# Patient Record
Sex: FEMALE | Race: WHITE | NOT HISPANIC OR LATINO | Employment: FULL TIME | URBAN - NONMETROPOLITAN AREA
[De-identification: names, ages, dates, MRNs, and addresses within clinical notes are randomized per-mention and may not be internally consistent; named-entity substitution may affect disease eponyms.]

---

## 2017-06-14 RX ORDER — NORETHINDRONE ACETATE AND ETHINYL ESTRADIOL AND FERROUS FUMARATE 1MG-20(21)
KIT ORAL
Qty: 28 TABLET | Refills: 0 | Status: SHIPPED | OUTPATIENT
Start: 2017-06-14

## 2017-12-26 LAB
EXTERNAL ANTIBODY SCREEN: NEGATIVE
EXTERNAL HEPATITIS B SURFACE ANTIGEN: NEGATIVE
EXTERNAL HERPES PCR: NEGATIVE
EXTERNAL RUBELLA QUALITATIVE: NORMAL
EXTERNAL SYPHILIS RPR SCREEN: NORMAL
HIV1 P24 AG SERPL QL IA: NEGATIVE

## 2017-12-28 ENCOUNTER — LAB (OUTPATIENT)
Dept: LAB | Facility: HOSPITAL | Age: 22
End: 2017-12-28
Attending: OBSTETRICS & GYNECOLOGY

## 2017-12-28 ENCOUNTER — TRANSCRIBE ORDERS (OUTPATIENT)
Dept: ADMINISTRATIVE | Facility: HOSPITAL | Age: 22
End: 2017-12-28

## 2017-12-28 DIAGNOSIS — J02.9 SORE THROAT: ICD-10-CM

## 2017-12-28 DIAGNOSIS — R50.9 FEVER, UNSPECIFIED FEVER CAUSE: ICD-10-CM

## 2017-12-28 DIAGNOSIS — R50.9 FEVER, UNSPECIFIED FEVER CAUSE: Primary | ICD-10-CM

## 2017-12-28 LAB
FLUAV AG NPH QL: NEGATIVE
FLUBV AG NPH QL IA: NEGATIVE
S PYO AG THROAT QL: NEGATIVE

## 2017-12-28 PROCEDURE — 87081 CULTURE SCREEN ONLY: CPT | Performed by: OBSTETRICS & GYNECOLOGY

## 2017-12-28 PROCEDURE — 87880 STREP A ASSAY W/OPTIC: CPT | Performed by: OBSTETRICS & GYNECOLOGY

## 2017-12-28 PROCEDURE — 87804 INFLUENZA ASSAY W/OPTIC: CPT | Performed by: OBSTETRICS & GYNECOLOGY

## 2017-12-30 LAB — BACTERIA SPEC AEROBE CULT: NORMAL

## 2018-05-01 LAB
EXTERNAL CHLAMYDIA SCREEN: NEGATIVE
EXTERNAL GONORRHEA SCREEN: NEGATIVE

## 2018-07-10 LAB — EXTERNAL GROUP B STREP ANTIGEN: NEGATIVE

## 2018-07-22 ENCOUNTER — HOSPITAL ENCOUNTER (OUTPATIENT)
Facility: HOSPITAL | Age: 23
Setting detail: OBSERVATION
Discharge: HOME | End: 2018-07-22
Attending: OBSTETRICS & GYNECOLOGY | Admitting: OBSTETRICS & GYNECOLOGY

## 2018-07-22 LAB — A1 MICROGLOB PLACENTAL VAG QL: NEGATIVE

## 2018-07-22 PROCEDURE — G0378 HOSPITAL OBSERVATION PER HR: HCPCS

## 2018-07-22 PROCEDURE — 84112 EVAL AMNIOTIC FLUID PROTEIN: CPT | Performed by: OBSTETRICS & GYNECOLOGY

## 2018-07-22 NOTE — NURSING NOTE
Patient presents to nursing unit with c/o back pain, abd pain and contractions.       See medical records for charting of assessment and fetal monitoring. Unexpected downtime during this shift.      Aisha Suarez RN  3:22 PM

## 2018-07-23 ENCOUNTER — APPOINTMENT (OUTPATIENT)
Dept: ULTRASOUND IMAGING | Facility: HOSPITAL | Age: 23
End: 2018-07-23

## 2018-07-23 ENCOUNTER — ANESTHESIA (OUTPATIENT)
Dept: LABOR AND DELIVERY | Facility: HOSPITAL | Age: 23
End: 2018-07-23

## 2018-07-23 ENCOUNTER — ANESTHESIA EVENT (OUTPATIENT)
Dept: LABOR AND DELIVERY | Facility: HOSPITAL | Age: 23
End: 2018-07-23

## 2018-07-23 ENCOUNTER — HOSPITAL ENCOUNTER (INPATIENT)
Facility: HOSPITAL | Age: 23
LOS: 2 days | Discharge: HOME | End: 2018-07-25
Attending: OBSTETRICS & GYNECOLOGY | Admitting: OBSTETRICS & GYNECOLOGY

## 2018-07-23 PROBLEM — Z34.93 THIRD TRIMESTER PREGNANCY: Status: ACTIVE | Noted: 2018-07-23

## 2018-07-23 PROBLEM — Z34.93 THIRD TRIMESTER PREGNANCY: Status: RESOLVED | Noted: 2018-07-23 | Resolved: 2018-07-23

## 2018-07-23 PROBLEM — O47.9 THREATENED LABOR AT TERM: Status: ACTIVE | Noted: 2018-07-23

## 2018-07-23 PROBLEM — Z34.90 PREGNANCY: Status: RESOLVED | Noted: 2018-07-23 | Resolved: 2018-07-23

## 2018-07-23 PROBLEM — Z34.90 PREGNANCY: Status: ACTIVE | Noted: 2018-07-23

## 2018-07-23 PROBLEM — O47.9 THREATENED LABOR AT TERM: Status: RESOLVED | Noted: 2018-07-23 | Resolved: 2018-07-23

## 2018-07-23 LAB
A1 MICROGLOB PLACENTAL VAG QL: NEGATIVE
ABO GROUP BLD: NORMAL
BLD GP AB SCN SERPL QL: NEGATIVE
DEPRECATED RDW RBC AUTO: 40.8 FL (ref 40–54)
ERYTHROCYTE [DISTWIDTH] IN BLOOD BY AUTOMATED COUNT: 13 % (ref 12–15)
HCT VFR BLD AUTO: 35.4 % (ref 37–47)
HGB BLD-MCNC: 12.2 G/DL (ref 12–16)
LYMPHOCYTES # BLD MANUAL: 1.45 10*3/MM3 (ref 0.72–4.86)
LYMPHOCYTES NFR BLD MANUAL: 5 % (ref 4–12)
LYMPHOCYTES NFR BLD MANUAL: 9 % (ref 15–45)
MCH RBC QN AUTO: 30 PG (ref 28–32)
MCHC RBC AUTO-ENTMCNC: 34.5 G/DL (ref 33–36)
MCV RBC AUTO: 87.2 FL (ref 82–98)
MONOCYTES # BLD AUTO: 0.81 10*3/MM3 (ref 0.19–1.3)
NEUTROPHILS # BLD AUTO: 13.53 10*3/MM3 (ref 1.87–8.4)
NEUTROPHILS NFR BLD MANUAL: 83 % (ref 39–78)
NEUTS BAND NFR BLD MANUAL: 1 % (ref 0–10)
PLAT MORPH BLD: NORMAL
PLATELET # BLD AUTO: 345 10*3/MM3 (ref 130–400)
PMV BLD AUTO: 9.6 FL (ref 6–12)
POLYCHROMASIA BLD QL SMEAR: ABNORMAL
RBC # BLD AUTO: 4.06 10*6/MM3 (ref 4.2–5.4)
RH BLD: POSITIVE
SPHEROCYTES BLD QL SMEAR: ABNORMAL
T&S EXPIRATION DATE: NORMAL
VARIANT LYMPHS NFR BLD MANUAL: 2 % (ref 0–5)
WBC MORPH BLD: NORMAL
WBC NRBC COR # BLD: 16.11 10*3/MM3 (ref 4.8–10.8)

## 2018-07-23 PROCEDURE — 25010000002 GENTAMICIN PER 80 MG: Performed by: OBSTETRICS & GYNECOLOGY

## 2018-07-23 PROCEDURE — 88307 TISSUE EXAM BY PATHOLOGIST: CPT | Performed by: OBSTETRICS & GYNECOLOGY

## 2018-07-23 PROCEDURE — 25010000002 AMPICILLIN PER 500 MG: Performed by: OBSTETRICS & GYNECOLOGY

## 2018-07-23 PROCEDURE — 85007 BL SMEAR W/DIFF WBC COUNT: CPT | Performed by: OBSTETRICS & GYNECOLOGY

## 2018-07-23 PROCEDURE — 86850 RBC ANTIBODY SCREEN: CPT | Performed by: OBSTETRICS & GYNECOLOGY

## 2018-07-23 PROCEDURE — 25010000002 FENTANYL CITRATE (PF) 250 MCG/5ML SOLUTION: Performed by: NURSE ANESTHETIST, CERTIFIED REGISTERED

## 2018-07-23 PROCEDURE — 84112 EVAL AMNIOTIC FLUID PROTEIN: CPT | Performed by: OBSTETRICS & GYNECOLOGY

## 2018-07-23 PROCEDURE — C1755 CATHETER, INTRASPINAL: HCPCS | Performed by: NURSE ANESTHETIST, CERTIFIED REGISTERED

## 2018-07-23 PROCEDURE — 0HQ9XZZ REPAIR PERINEUM SKIN, EXTERNAL APPROACH: ICD-10-PCS | Performed by: OBSTETRICS & GYNECOLOGY

## 2018-07-23 PROCEDURE — 25010000002 TERBUTALINE PER 1 MG

## 2018-07-23 PROCEDURE — 86900 BLOOD TYPING SEROLOGIC ABO: CPT | Performed by: OBSTETRICS & GYNECOLOGY

## 2018-07-23 PROCEDURE — 85027 COMPLETE CBC AUTOMATED: CPT | Performed by: OBSTETRICS & GYNECOLOGY

## 2018-07-23 PROCEDURE — 25010000002 ROPIVACAINE PER 1 MG: Performed by: NURSE ANESTHETIST, CERTIFIED REGISTERED

## 2018-07-23 PROCEDURE — 25010000002 BUTORPHANOL PER 1 MG: Performed by: OBSTETRICS & GYNECOLOGY

## 2018-07-23 PROCEDURE — 86901 BLOOD TYPING SEROLOGIC RH(D): CPT | Performed by: OBSTETRICS & GYNECOLOGY

## 2018-07-23 PROCEDURE — 51703 INSERT BLADDER CATH COMPLEX: CPT

## 2018-07-23 PROCEDURE — 76815 OB US LIMITED FETUS(S): CPT

## 2018-07-23 RX ORDER — SODIUM CHLORIDE 0.9 % (FLUSH) 0.9 %
1-10 SYRINGE (ML) INJECTION AS NEEDED
Status: DISCONTINUED | OUTPATIENT
Start: 2018-07-23 | End: 2018-07-25 | Stop reason: HOSPADM

## 2018-07-23 RX ORDER — ONDANSETRON 2 MG/ML
4 INJECTION INTRAMUSCULAR; INTRAVENOUS EVERY 6 HOURS PRN
Status: DISCONTINUED | OUTPATIENT
Start: 2018-07-23 | End: 2018-07-23 | Stop reason: SDUPTHER

## 2018-07-23 RX ORDER — LIDOCAINE HYDROCHLORIDE 10 MG/ML
5 INJECTION, SOLUTION EPIDURAL; INFILTRATION; INTRACAUDAL; PERINEURAL AS NEEDED
Status: DISCONTINUED | OUTPATIENT
Start: 2018-07-23 | End: 2018-07-23 | Stop reason: HOSPADM

## 2018-07-23 RX ORDER — OXYCODONE HYDROCHLORIDE AND ACETAMINOPHEN 5; 325 MG/1; MG/1
1 TABLET ORAL EVERY 6 HOURS PRN
Status: DISCONTINUED | OUTPATIENT
Start: 2018-07-23 | End: 2018-07-25 | Stop reason: HOSPADM

## 2018-07-23 RX ORDER — FENTANYL CITRATE 50 UG/ML
INJECTION, SOLUTION INTRAMUSCULAR; INTRAVENOUS AS NEEDED
Status: DISCONTINUED | OUTPATIENT
Start: 2018-07-23 | End: 2018-07-24 | Stop reason: SURG

## 2018-07-23 RX ORDER — LIDOCAINE HYDROCHLORIDE AND EPINEPHRINE 15; 5 MG/ML; UG/ML
INJECTION, SOLUTION EPIDURAL AS NEEDED
Status: DISCONTINUED | OUTPATIENT
Start: 2018-07-23 | End: 2018-07-24 | Stop reason: SURG

## 2018-07-23 RX ORDER — CARBOPROST TROMETHAMINE 250 UG/ML
250 INJECTION, SOLUTION INTRAMUSCULAR AS NEEDED
Status: DISCONTINUED | OUTPATIENT
Start: 2018-07-23 | End: 2018-07-23 | Stop reason: HOSPADM

## 2018-07-23 RX ORDER — HETASTARCH 6 G/100ML
INJECTION, SOLUTION INTRAVENOUS
Status: COMPLETED
Start: 2018-07-23 | End: 2018-07-23

## 2018-07-23 RX ORDER — PROMETHAZINE HYDROCHLORIDE 12.5 MG/1
12.5 SUPPOSITORY RECTAL EVERY 6 HOURS PRN
Status: DISCONTINUED | OUTPATIENT
Start: 2018-07-23 | End: 2018-07-25 | Stop reason: HOSPADM

## 2018-07-23 RX ORDER — ONDANSETRON 2 MG/ML
4 INJECTION INTRAMUSCULAR; INTRAVENOUS EVERY 6 HOURS PRN
Status: DISCONTINUED | OUTPATIENT
Start: 2018-07-23 | End: 2018-07-25 | Stop reason: HOSPADM

## 2018-07-23 RX ORDER — ONDANSETRON 2 MG/ML
4 INJECTION INTRAMUSCULAR; INTRAVENOUS EVERY 6 HOURS PRN
Status: DISCONTINUED | OUTPATIENT
Start: 2018-07-23 | End: 2018-07-23

## 2018-07-23 RX ORDER — OXYTOCIN/RINGER'S LACTATE 20/1000 ML
999 PLASTIC BAG, INJECTION (ML) INTRAVENOUS ONCE
Status: DISCONTINUED | OUTPATIENT
Start: 2018-07-23 | End: 2018-07-23 | Stop reason: HOSPADM

## 2018-07-23 RX ORDER — ONDANSETRON 4 MG/1
4 TABLET, ORALLY DISINTEGRATING ORAL EVERY 6 HOURS PRN
Status: DISCONTINUED | OUTPATIENT
Start: 2018-07-23 | End: 2018-07-25 | Stop reason: HOSPADM

## 2018-07-23 RX ORDER — METHYLERGONOVINE MALEATE 0.2 MG/ML
200 INJECTION INTRAVENOUS ONCE AS NEEDED
Status: DISCONTINUED | OUTPATIENT
Start: 2018-07-23 | End: 2018-07-25 | Stop reason: HOSPADM

## 2018-07-23 RX ORDER — OXYTOCIN/RINGER'S LACTATE 20/1000 ML
999 PLASTIC BAG, INJECTION (ML) INTRAVENOUS CONTINUOUS PRN
Status: DISCONTINUED | OUTPATIENT
Start: 2018-07-23 | End: 2018-07-23 | Stop reason: SDUPTHER

## 2018-07-23 RX ORDER — MISOPROSTOL 200 UG/1
600 TABLET ORAL ONCE AS NEEDED
Status: DISCONTINUED | OUTPATIENT
Start: 2018-07-23 | End: 2018-07-25 | Stop reason: HOSPADM

## 2018-07-23 RX ORDER — OXYTOCIN/0.9 % SODIUM CHLORIDE 30/500 ML
2-30 PLASTIC BAG, INJECTION (ML) INTRAVENOUS
Status: DISCONTINUED | OUTPATIENT
Start: 2018-07-23 | End: 2018-07-23

## 2018-07-23 RX ORDER — PROMETHAZINE HYDROCHLORIDE 25 MG/1
25 TABLET ORAL EVERY 6 HOURS PRN
Status: DISCONTINUED | OUTPATIENT
Start: 2018-07-23 | End: 2018-07-25 | Stop reason: HOSPADM

## 2018-07-23 RX ORDER — ONDANSETRON 4 MG/1
4 TABLET, FILM COATED ORAL EVERY 6 HOURS PRN
Status: DISCONTINUED | OUTPATIENT
Start: 2018-07-23 | End: 2018-07-25 | Stop reason: HOSPADM

## 2018-07-23 RX ORDER — MISOPROSTOL 200 UG/1
800 TABLET ORAL AS NEEDED
Status: DISCONTINUED | OUTPATIENT
Start: 2018-07-23 | End: 2018-07-23 | Stop reason: HOSPADM

## 2018-07-23 RX ORDER — PROMETHAZINE HYDROCHLORIDE 25 MG/ML
12.5 INJECTION, SOLUTION INTRAMUSCULAR; INTRAVENOUS EVERY 6 HOURS PRN
Status: DISCONTINUED | OUTPATIENT
Start: 2018-07-23 | End: 2018-07-25 | Stop reason: HOSPADM

## 2018-07-23 RX ORDER — PRENATAL VIT/IRON FUM/FOLIC AC 27MG-0.8MG
1 TABLET ORAL DAILY
Status: DISCONTINUED | OUTPATIENT
Start: 2018-07-23 | End: 2018-07-25 | Stop reason: HOSPADM

## 2018-07-23 RX ORDER — DOCUSATE SODIUM 100 MG/1
100 CAPSULE, LIQUID FILLED ORAL 2 TIMES DAILY PRN
Status: DISCONTINUED | OUTPATIENT
Start: 2018-07-23 | End: 2018-07-25 | Stop reason: HOSPADM

## 2018-07-23 RX ORDER — IBUPROFEN 800 MG/1
800 TABLET ORAL EVERY 8 HOURS PRN
Status: DISCONTINUED | OUTPATIENT
Start: 2018-07-23 | End: 2018-07-25 | Stop reason: HOSPADM

## 2018-07-23 RX ORDER — BUTORPHANOL TARTRATE 1 MG/ML
1 INJECTION, SOLUTION INTRAMUSCULAR; INTRAVENOUS
Status: DISCONTINUED | OUTPATIENT
Start: 2018-07-23 | End: 2018-07-23

## 2018-07-23 RX ORDER — TERBUTALINE SULFATE 1 MG/ML
INJECTION, SOLUTION SUBCUTANEOUS
Status: COMPLETED
Start: 2018-07-23 | End: 2018-07-23

## 2018-07-23 RX ORDER — PRENATAL VIT NO.126/IRON/FOLIC 28MG-0.8MG
1 TABLET ORAL DAILY
COMMUNITY
End: 2021-07-29

## 2018-07-23 RX ORDER — OXYTOCIN/RINGER'S LACTATE 20/1000 ML
125 PLASTIC BAG, INJECTION (ML) INTRAVENOUS AS NEEDED
Status: DISCONTINUED | OUTPATIENT
Start: 2018-07-23 | End: 2018-07-23 | Stop reason: HOSPADM

## 2018-07-23 RX ORDER — CARBOPROST TROMETHAMINE 250 UG/ML
250 INJECTION, SOLUTION INTRAMUSCULAR ONCE AS NEEDED
Status: DISCONTINUED | OUTPATIENT
Start: 2018-07-23 | End: 2018-07-25 | Stop reason: HOSPADM

## 2018-07-23 RX ORDER — EPHEDRINE SULFATE 50 MG/ML
5 INJECTION, SOLUTION INTRAVENOUS
Status: DISCONTINUED | OUTPATIENT
Start: 2018-07-23 | End: 2018-07-23 | Stop reason: HOSPADM

## 2018-07-23 RX ORDER — HETASTARCH 6 G/100ML
500 INJECTION, SOLUTION INTRAVENOUS CONTINUOUS
Status: DISCONTINUED | OUTPATIENT
Start: 2018-07-23 | End: 2018-07-23

## 2018-07-23 RX ORDER — METHYLERGONOVINE MALEATE 0.2 MG/ML
200 INJECTION INTRAVENOUS ONCE AS NEEDED
Status: DISCONTINUED | OUTPATIENT
Start: 2018-07-23 | End: 2018-07-23 | Stop reason: HOSPADM

## 2018-07-23 RX ORDER — BISACODYL 10 MG
10 SUPPOSITORY, RECTAL RECTAL DAILY PRN
Status: DISCONTINUED | OUTPATIENT
Start: 2018-07-24 | End: 2018-07-25 | Stop reason: HOSPADM

## 2018-07-23 RX ORDER — TERBUTALINE SULFATE 1 MG/ML
0.25 INJECTION, SOLUTION SUBCUTANEOUS ONCE
Status: COMPLETED | OUTPATIENT
Start: 2018-07-23 | End: 2018-07-23

## 2018-07-23 RX ORDER — SODIUM CHLORIDE 0.9 % (FLUSH) 0.9 %
1-10 SYRINGE (ML) INJECTION AS NEEDED
Status: DISCONTINUED | OUTPATIENT
Start: 2018-07-23 | End: 2018-07-23 | Stop reason: HOSPADM

## 2018-07-23 RX ORDER — OXYTOCIN/RINGER'S LACTATE 20/1000 ML
125 PLASTIC BAG, INJECTION (ML) INTRAVENOUS AS NEEDED
Status: DISCONTINUED | OUTPATIENT
Start: 2018-07-23 | End: 2018-07-23 | Stop reason: SDUPTHER

## 2018-07-23 RX ORDER — SODIUM CHLORIDE, SODIUM LACTATE, POTASSIUM CHLORIDE, CALCIUM CHLORIDE 600; 310; 30; 20 MG/100ML; MG/100ML; MG/100ML; MG/100ML
125 INJECTION, SOLUTION INTRAVENOUS CONTINUOUS
Status: DISCONTINUED | OUTPATIENT
Start: 2018-07-23 | End: 2018-07-23

## 2018-07-23 RX ORDER — ROPIVACAINE HYDROCHLORIDE 2 MG/ML
INJECTION, SOLUTION EPIDURAL; INFILTRATION; PERINEURAL AS NEEDED
Status: DISCONTINUED | OUTPATIENT
Start: 2018-07-23 | End: 2018-07-24 | Stop reason: SURG

## 2018-07-23 RX ADMIN — OXYTOCIN-SODIUM CHLORIDE 0.9% IV SOLN 30 UNIT/500ML 2 MILLI-UNITS/MIN: 30-0.9/5 SOLUTION at 02:26

## 2018-07-23 RX ADMIN — BUTORPHANOL TARTRATE 1 MG: 1 INJECTION, SOLUTION INTRAMUSCULAR; INTRAVENOUS at 02:18

## 2018-07-23 RX ADMIN — EPHEDRINE SULFATE 10 MG: 50 INJECTION INTRAMUSCULAR; INTRAVENOUS; SUBCUTANEOUS at 06:55

## 2018-07-23 RX ADMIN — LIDOCAINE HYDROCHLORIDE 3 ML: 10 INJECTION, SOLUTION EPIDURAL; INFILTRATION; INTRACAUDAL; PERINEURAL at 05:04

## 2018-07-23 RX ADMIN — AMPICILLIN SODIUM 2 G: 2 INJECTION, POWDER, FOR SOLUTION INTRAMUSCULAR; INTRAVENOUS at 10:15

## 2018-07-23 RX ADMIN — SODIUM CHLORIDE, POTASSIUM CHLORIDE, SODIUM LACTATE AND CALCIUM CHLORIDE 125 ML/HR: 600; 310; 30; 20 INJECTION, SOLUTION INTRAVENOUS at 01:20

## 2018-07-23 RX ADMIN — OXYCODONE HYDROCHLORIDE AND ACETAMINOPHEN 1 TABLET: 5; 325 TABLET ORAL at 21:30

## 2018-07-23 RX ADMIN — TERBUTALINE SULFATE 0.25 MG: 1 INJECTION SUBCUTANEOUS at 03:42

## 2018-07-23 RX ADMIN — HETASTARCH 500 ML: 6 INJECTION, SOLUTION INTRAVENOUS at 06:54

## 2018-07-23 RX ADMIN — SODIUM CHLORIDE, POTASSIUM CHLORIDE, SODIUM LACTATE AND CALCIUM CHLORIDE 125 ML/HR: 600; 310; 30; 20 INJECTION, SOLUTION INTRAVENOUS at 06:28

## 2018-07-23 RX ADMIN — SODIUM CHLORIDE, POTASSIUM CHLORIDE, SODIUM LACTATE AND CALCIUM CHLORIDE 125 ML/HR: 600; 310; 30; 20 INJECTION, SOLUTION INTRAVENOUS at 04:45

## 2018-07-23 RX ADMIN — ACETAMINOPHEN 650 MG: 650 SUPPOSITORY RECTAL at 08:59

## 2018-07-23 RX ADMIN — FENTANYL CITRATE 200 MCG: 50 INJECTION INTRAMUSCULAR; INTRAVENOUS at 05:20

## 2018-07-23 RX ADMIN — LIDOCAINE HYDROCHLORIDE AND EPINEPHRINE 3 ML: 15; 5 INJECTION, SOLUTION EPIDURAL at 05:08

## 2018-07-23 RX ADMIN — GENTAMICIN SULFATE 180 MG: 40 INJECTION, SOLUTION INTRAMUSCULAR; INTRAVENOUS at 10:53

## 2018-07-23 RX ADMIN — FENTANYL CITRATE 50 MCG: 50 INJECTION INTRAMUSCULAR; INTRAVENOUS at 05:14

## 2018-07-23 RX ADMIN — OXYTOCIN 125 ML/HR: 10 INJECTION INTRAVENOUS at 14:07

## 2018-07-23 RX ADMIN — ROPIVACAINE HYDROCHLORIDE 10 ML/HR: 2 INJECTION, SOLUTION EPIDURAL; INFILTRATION at 05:20

## 2018-07-23 RX ADMIN — IBUPROFEN 800 MG: 800 TABLET ORAL at 21:30

## 2018-07-23 RX ADMIN — PRENATAL VIT W/ FE FUMARATE-FA TAB 27-0.8 MG 1 TABLET: 27-0.8 TAB at 19:48

## 2018-07-23 RX ADMIN — OXYTOCIN 125 ML/HR: 10 INJECTION INTRAVENOUS at 12:46

## 2018-07-23 RX ADMIN — TERBUTALINE SULFATE 0.25 MG: 1 INJECTION, SOLUTION SUBCUTANEOUS at 03:42

## 2018-07-23 RX ADMIN — ROPIVACAINE HYDROCHLORIDE 8 ML: 2 INJECTION, SOLUTION EPIDURAL; INFILTRATION at 05:14

## 2018-07-23 NOTE — PLAN OF CARE
Problem: Patient Care Overview  Goal: Plan of Care Review  Outcome: Ongoing (interventions implemented as appropriate)   07/23/18 1733   Coping/Psychosocial   Plan of Care Reviewed With patient   V/S stable, ff/ml/u2, lochia scant to light, viridiana edema present, comfort products in use, patient ambulating in room, showered, voiding, admission teaching completed  Goal: Individualization and Mutuality  Outcome: Ongoing (interventions implemented as appropriate)   07/23/18 1733   Individualization   Patient Specific Preferences NICU visits often   Patient Specific Goals (Include Timeframe) allow as soon as possible when baby is stable for visitors   Patient Specific Interventions accomodate with wheelchair, provide directions on visits to NICU     Goal: Discharge Needs Assessment  Outcome: Ongoing (interventions implemented as appropriate)   07/23/18 1733   Discharge Needs Assessment   Readmission Within the Last 30 Days no previous admission in last 30 days       Problem: Postpartum (Vaginal Delivery) (Adult,Obstetrics,Pediatric)  Goal: Signs and Symptoms of Listed Potential Problems Will be Absent, Minimized or Managed (Postpartum)  Outcome: Ongoing (interventions implemented as appropriate)   07/23/18 1733   Goal/Outcome Evaluation   Problems Assessed (Postpartum Vaginal Delivery) all   Problems Present (Postpartum Vag Deliv) none

## 2018-07-23 NOTE — ANESTHESIA PROCEDURE NOTES
Labor Epidural    Patient location during procedure: OB  Performed By  CRNA: PRATEEK SOSA  Preanesthetic Checklist  Completed: patient identified, site marked, surgical consent, pre-op evaluation, timeout performed, IV checked, risks and benefits discussed and monitors and equipment checked  Prep:  Pt Position:sitting  Sterile Tech:cap, mask, gloves and sterile barrier  Prep:chlorhexidine gluconate and isopropyl alcohol  Monitoring:continuous pulse oximetry and blood pressure monitoring  Epidural Block Procedure:  Approach:midline  Guidance:landmark technique  Location:L4-L5  Needle Type:Tuohy  Needle Gauge:17 G  Loss of Resistance Medium: saline  Loss of Resistance: 8cm  Cath Depth at skin:14 cm  Paresthesia: none  Aspiration:negative  Test Dose:negative  Number of Attempts: 1  Post Assessment:  Dressing:occlusive dressing applied and secured with tape  Pt Tolerance:patient tolerated the procedure well with no apparent complications  Complications:no

## 2018-07-23 NOTE — LACTATION NOTE
This note was copied from a baby's chart.  37 6/7 week infant, Fransisco, delivered 18 @ 1206. Birth weight 8-9.6 (3900 g). Mother was febrile and infant was admitted to NICU for chorioamnionitis. Attempted to breastfeed in NICU @ 1730. Infant sleepy or crying. With instruction, mother able to latch infant well, despite drowsiness of infant. Infant would not suck. Mother able to easily hand express large drops to infant's mouth. Assisted with hand expressing additional 4 ml and nurse syringe fed infant with pacifier. Reviewed pumping instructions with hospital grade and manual breast pump. NICU folder of breastfeeding handouts and breastfeeding books provided. Pumping cleaning instructions reviewed and steaming of breast pump parts.     Maternal Hx: , Asthma,   Prenatal Meds: Augmentin, Flexeril, Naproxen, PNV, Junel FE  Pump: yes

## 2018-07-23 NOTE — ANESTHESIA PREPROCEDURE EVALUATION
Anesthesia Evaluation     Patient summary reviewed and Nursing notes reviewed   no history of anesthetic complications:  NPO Solid Status: > 6 hours  NPO Liquid Status: < 2 hours           Airway   Mallampati: II  TM distance: >3 FB  Neck ROM: full  No difficulty expected  Dental - normal exam     Pulmonary - normal exam   (+) asthma (exercise induced- does not use inhaler),   Cardiovascular - negative cardio ROS and normal exam    (-) hypertension      Neuro/Psych- negative ROS  GI/Hepatic/Renal/Endo    (+)  GERD,    (-) diabetes    Musculoskeletal (-) negative ROS    Abdominal    Substance History - negative use     OB/GYN    (+) Pregnant,         Other            Phys Exam Other: WBC       Date                     Value               Ref Range           Status                07/23/2018               16.11 (H)           4.80 - 10.80 1*     Final              Hemoglobin       Date                     Value               Ref Range           Status                07/23/2018               12.2                12.0 - 16.0 g/*     Final              Hematocrit       Date                     Value               Ref Range           Status                07/23/2018               35.4 (L)            37.0 - 47.0 %       Final            ----------  MCV       Date                     Value               Ref Range           Status                07/23/2018               87.2                82.0 - 98.0 fL      Final            ----------  MCH       Date                     Value               Ref Range           Status                07/23/2018               30.0                28.0 - 32.0 pg      Final              Platelets       Date                     Value               Ref Range           Status                07/23/2018               345                 130 - 400 10*3*     Final                          Anesthesia Plan    ASA 2     epidural     Anesthetic plan and risks discussed with patient.    Plan discussed with  CRNA.

## 2018-07-23 NOTE — L&D DELIVERY NOTE
[facility]  Vaginal Delivery Note    Delivery     Delivery: Vaginal, Spontaneous Delivery     YOB: 2020    Time of Birth: 12:06 PM      Anesthesia: Epidural     Delivering clinician: Narda Corbett    Forceps?   No   Vacuum? No    Shoulder dystocia present: Yes Shoulder Dystocia Details  Dystocia Present? Yes   Time head delivered:     Gentle attempt at traction, assisted by maternal expulsive forces: Yes   If no, why:     Anterior shoulder:     Time recognized: 7/23/2018 12:05 PM     Time help called: 7/23/2018 12:00 PM  Called by: Dr. Corbett/AIME Taylor    Time provider arrived: 7/23/2018 12:00 PM  Provider who arrived: Dr. Corbett    Time NICU arrived: 7/23/2018 12:00 PM  NICU staff: CORNELIUS Friend/Kathleen Castrejon RN    Time additional staff arrived: 7/23/2018 12:05 PM  Additional staff:             Delivery narrative:  Shoulder dystocia, delivered with becky, woods screw & SP pressure    Infant    Findings: male  infant     Infant observations: Weight: 3900 g (8 lb 9.6 oz)   Length: 21  in  Observations/Comments:         Apgars: 7   @ 1 minute /    9   @ 5 minutes   Infant Name:      Placenta, Cord, and Fluid    Placenta delivered  Spontaneous  at   7/23/2018 12:15 PM     Cord: 3 vessels  present.   Nuchal Cord?  no   Cord blood obtained: Yes    Cord gases obtained:  Yes    Cord gas results: Venous:  No results found for: PHCVEN    Arterial:  No results found for: PHCART     Repair    Episiotomy: None    Lacerations: Yes  Laceration Information  Laceration Repaired?   Perineal: 1st  Yes    Periurethral:         Labial:         Sulcus:         Vaginal: No       Cervical: No          Suture used for repair: 2-0 Vicryl    Estimated Blood Loss: Est. Blood Loss (mL): 300 mL (Filed from Delivery Summary) (07/23/18 1206)           Complications  Shoulder dystocia, and chorioamnionitis    Disposition  Mother to Mother Baby/Postpartum  in stable condition currently.  Baby to NICU  in stable condition  currently.      Narda Corbett MD  07/24/18  7:10 AM

## 2018-07-23 NOTE — NURSING NOTE
Dr. Corbett notified of Positive Nitrazine and Positive Pooling, but Negative Amnisure result.  Order received to Hold Pitocin, and obtain US to check ANGEL

## 2018-07-23 NOTE — PLAN OF CARE
Problem: Patient Care Overview  Goal: Plan of Care Review  Outcome: Ongoing (interventions implemented as appropriate)   07/23/18 0729   Coping/Psychosocial   Plan of Care Reviewed With patient   Plan of Care Review   Progress improving     Goal: Individualization and Mutuality  Outcome: Ongoing (interventions implemented as appropriate)    Goal: Discharge Needs Assessment  Outcome: Ongoing (interventions implemented as appropriate)    Goal: Interprofessional Rounds/Family Conf  Outcome: Ongoing (interventions implemented as appropriate)      Problem: Labor (Cervical Ripen, Induct, Augment) (Adult,Obstetrics,Pediatric)  Goal: Signs and Symptoms of Listed Potential Problems Will be Absent, Minimized or Managed (Labor)  Outcome: Ongoing (interventions implemented as appropriate)

## 2018-07-23 NOTE — NURSING NOTE
Dr. Corbett updated on pt's status: re fetal tachycardia, received 2 abx, has been pushing for an hour and still has a low grade temp.  Continue present care.

## 2018-07-24 LAB
HCT VFR BLD AUTO: 29.6 % (ref 37–47)
HGB BLD-MCNC: 9.8 G/DL (ref 12–16)

## 2018-07-24 PROCEDURE — 85014 HEMATOCRIT: CPT | Performed by: OBSTETRICS & GYNECOLOGY

## 2018-07-24 PROCEDURE — 85018 HEMOGLOBIN: CPT | Performed by: OBSTETRICS & GYNECOLOGY

## 2018-07-24 PROCEDURE — 90715 TDAP VACCINE 7 YRS/> IM: CPT | Performed by: OBSTETRICS & GYNECOLOGY

## 2018-07-24 PROCEDURE — 25010000002 TDAP 5-2.5-18.5 LF-MCG/0.5 SUSPENSION: Performed by: OBSTETRICS & GYNECOLOGY

## 2018-07-24 PROCEDURE — 90471 IMMUNIZATION ADMIN: CPT | Performed by: OBSTETRICS & GYNECOLOGY

## 2018-07-24 RX ADMIN — OXYCODONE HYDROCHLORIDE AND ACETAMINOPHEN 1 TABLET: 5; 325 TABLET ORAL at 22:06

## 2018-07-24 RX ADMIN — PRENATAL VIT W/ FE FUMARATE-FA TAB 27-0.8 MG 1 TABLET: 27-0.8 TAB at 10:26

## 2018-07-24 RX ADMIN — IBUPROFEN 800 MG: 800 TABLET ORAL at 11:00

## 2018-07-24 RX ADMIN — OXYCODONE HYDROCHLORIDE AND ACETAMINOPHEN 1 TABLET: 5; 325 TABLET ORAL at 11:00

## 2018-07-24 RX ADMIN — IBUPROFEN 800 MG: 800 TABLET ORAL at 22:06

## 2018-07-24 RX ADMIN — TETANUS TOXOID, REDUCED DIPHTHERIA TOXOID AND ACELLULAR PERTUSSIS VACCINE, ADSORBED 0.5 ML: 5; 2.5; 8; 8; 2.5 SUSPENSION INTRAMUSCULAR at 14:07

## 2018-07-24 RX ADMIN — OXYCODONE HYDROCHLORIDE AND ACETAMINOPHEN 1 TABLET: 5; 325 TABLET ORAL at 04:30

## 2018-07-24 NOTE — PROGRESS NOTES
"[facility]  Vaginal Delivery Progress Note    Subjective   Postpartum Day 1: Vaginal Delivery    The patient feels well.  Her pain is well controlled with prescribed pain medications.   She is ambulating well.  Patient describes her bleeding as moderate lochia.    Breastfeeding: without difficulty.    Objective     Vital Signs Range for the last 24 hours  Temperature: Temp:  [97.3 °F (36.3 °C)-101.3 °F (38.5 °C)] 97.5 °F (36.4 °C)   Temp Source: Temp src: Temporal Artery    BP: BP: ()/(43-94) 90/53   Pulse: Heart Rate:  [] 74   Respirations: Resp:  [18] 18   SPO2: SpO2:  [96 %-100 %] 100 %   O2 Amount (l/min):     O2 Devices Device (Oxygen Therapy): room air   Weight:       Admit Height:  Height: 157.5 cm (62\")      Physical Exam:  General:  no acute distresss.  Abdomen: Fundus: appropriate, firm, non tender  Extremities: normal, atraumatic, no cyanosis, and trace edema.     Lab results reviewed:  Yes   Rubella:  No results found for: RUBELLAIGGIN Nurse Transcribed from prenatal record --  No components found for: EXTRUBELQUAL  Rh Status:    RH type   Date Value Ref Range Status   07/23/2018 Positive  Final     Immunizations: There is no immunization history for the selected administration types on file for this patient.    Assessment/Plan     Active Problems:    * No active hospital problems. *      Nely De La Fuente is Day 1  post-partum  Vaginal, Spontaneous Delivery    .      Plan:  Continue current care.      Narda Corbett MD  7/24/2018  7:11 AM  "

## 2018-07-24 NOTE — ANESTHESIA POSTPROCEDURE EVALUATION
Patient: Nely De La Fuente    Procedure Summary     Date:  07/23/18 Room / Location:      Anesthesia Start:  0455 Anesthesia Stop:  1206    Procedure:  LABOR ANALGESIA Diagnosis:      Scheduled Providers:   Provider:  Rosa Isela Newell CRNA    Anesthesia Type:  epidural ASA Status:  2          Anesthesia Type: epidural  Last vitals  BP   90/53 (07/24/18 0427)   Temp   97.5 °F (36.4 °C) (07/24/18 0427)   Pulse   74 (07/24/18 0427)   Resp   18 (07/24/18 0427)     SpO2   100 % (07/24/18 0427)     Post Anesthesia Care and Evaluation    Patient location during evaluation: floor  Patient participation: complete - patient participated  Level of consciousness: awake and alert  Pain score: 0  Pain management: adequate  Airway patency: patent  Anesthetic complications: No anesthetic complications  PONV Status: none  Cardiovascular status: acceptable  Respiratory status: acceptable  Hydration status: acceptable  Post Neuraxial Block status: Motor and sensory function returned to baseline and No signs or symptoms of PDPH  Comments: ---------------------------               07/24/18 0427         ---------------------------   BP:           90/53         Pulse:         74           Resp:          18           Temp:   97.5 °F (36.4 °C)   SpO2:         100%         ---------------------------  No anesthesia care post op

## 2018-07-24 NOTE — PLAN OF CARE
Problem: Patient Care Overview  Goal: Plan of Care Review  Outcome: Ongoing (interventions implemented as appropriate)    Goal: Individualization and Mutuality  Outcome: Ongoing (interventions implemented as appropriate)    Goal: Discharge Needs Assessment  Outcome: Ongoing (interventions implemented as appropriate)    Goal: Interprofessional Rounds/Family Conf  Outcome: Ongoing (interventions implemented as appropriate)      Problem: Postpartum (Vaginal Delivery) (Adult,Obstetrics,Pediatric)  Goal: Signs and Symptoms of Listed Potential Problems Will be Absent, Minimized or Managed (Postpartum)  Outcome: Ongoing (interventions implemented as appropriate)      Problem: Breastfeeding (Adult,Obstetrics,Pediatric)  Goal: Signs and Symptoms of Listed Potential Problems Will be Absent, Minimized or Managed (Breastfeeding)  Outcome: Ongoing (interventions implemented as appropriate)

## 2018-07-24 NOTE — PLAN OF CARE
Problem: Patient Care Overview  Goal: Individualization and Mutuality  Outcome: Ongoing (interventions implemented as appropriate)   07/24/18 0324   Individualization   Patient Specific Preferences visits nicu often   Patient Specific Goals (Include Timeframe) increase milk supple   Patient Specific Interventions encourage pumping every 2-3 hours   Mutuality/Individual Preferences   What Anxieties, Fears, Concerns, or Questions Do You Have About Your Care? pain and too much bleeding   What Information Would Help Us Give You More Personalized Care? infant in nicu for chorio   How Would You and/or Your Support Person Like to Participate in Your Care? stay updated on plan of care   Mutuality/Individual Preferences   How to Address Anxieties/Fears answer questions as they arise     Goal: Discharge Needs Assessment  Outcome: Ongoing (interventions implemented as appropriate)    Goal: Interprofessional Rounds/Family Conf  Outcome: Ongoing (interventions implemented as appropriate)      Problem: Breastfeeding (Adult,Obstetrics,Pediatric)  Goal: Signs and Symptoms of Listed Potential Problems Will be Absent, Minimized or Managed (Breastfeeding)  Outcome: Ongoing (interventions implemented as appropriate)   07/24/18 0324   Goal/Outcome Evaluation   Problems Assessed (Breastfeeding) all   Problems Present (Breastfeeding) none

## 2018-07-24 NOTE — LACTATION NOTE
This note was copied from a baby's chart.  37 6/7 week infant, Fransisco, delivered 18 @ 1206. Birth weight 8-9.6 (3900 g). Current weight 8-9.2 (3890g).  Weight loss -0.26%.      Feeding orders:  30 mL EBM, may BF ad liliana or PO feed if RR < 65.      Called to NICU to assist with feeding, infant sleepy.  Infant either sleepy or crying at breast.  Multiple positions attempted.  Infant would not latch on for any consistent suckling.  Infant able to suck on pacifier without difficulty per infant's RN.  Attempted latching with shield, educating mother on nipple shield use, including education for how to latch infant without nipple shield.  Infant able to latch and suckle a few times with shield, but quickly becoming disinterested and sleepy. 2.5 mL EBM hand expressed and syringe fed to infant.   Provided encouragement to mother.  Mother states when she has attempted to pump she has not been able to collect anything.  She has hand expressed and collected some milk but states it is not as much as was obtained yesterday during lactation consult.  She did not pump every 3 hours through the night.  Encouraged mom to as much as possible adhere to at least every 2-3 hour pumping schedule, discussing supply and demand and the need for stimulation now to encourage milk supply later.  Verbalizes understanding.  Encouraged to hand express every hour when awake and pump for 15-20 mins on each breast at least every 3 hours.  Is cleaning pump parts appropriately and knowledgeable about steaming parts once every 24 hours.      Maternal Hx: , Asthma,   Prenatal Meds: Augmentin, Flexeril, Naproxen, PNV, Junel FE  Pump: yes

## 2018-07-25 VITALS
OXYGEN SATURATION: 97 % | HEIGHT: 62 IN | HEART RATE: 83 BPM | DIASTOLIC BLOOD PRESSURE: 53 MMHG | SYSTOLIC BLOOD PRESSURE: 92 MMHG | BODY MASS INDEX: 34.41 KG/M2 | RESPIRATION RATE: 16 BRPM | TEMPERATURE: 98 F | WEIGHT: 187 LBS

## 2018-07-25 LAB
CYTO UR: NORMAL
LAB AP CASE REPORT: NORMAL
LAB AP CLINICAL INFORMATION: NORMAL
PATH REPORT.FINAL DX SPEC: NORMAL
PATH REPORT.GROSS SPEC: NORMAL

## 2018-07-25 PROCEDURE — 25010000002 ROPIVACAINE PER 1 MG

## 2018-07-25 RX ORDER — IBUPROFEN 800 MG/1
800 TABLET ORAL EVERY 8 HOURS PRN
Qty: 90 TABLET | Refills: 2 | Status: SHIPPED | OUTPATIENT
Start: 2018-07-25 | End: 2021-07-29

## 2018-07-25 RX ORDER — OXYCODONE HYDROCHLORIDE AND ACETAMINOPHEN 5; 325 MG/1; MG/1
1 TABLET ORAL EVERY 6 HOURS PRN
Qty: 10 TABLET | Refills: 0 | Status: SHIPPED | OUTPATIENT
Start: 2018-07-25 | End: 2018-08-02

## 2018-07-25 NOTE — PROGRESS NOTES
"[facility]  Vaginal Delivery Progress Note    Subjective   Postpartum Day 2: Vaginal Delivery    The patient feels well.  Her pain is well controlled with prescribed pain medications.   She is ambulating well.  Patient describes her bleeding as thin lochia.    Breastfeeding: without difficulty.    Objective     Vital Signs Range for the last 24 hours  Temperature: Temp:  [97 °F (36.1 °C)-98 °F (36.7 °C)] 98 °F (36.7 °C)   Temp Source: Temp src: Oral   BP: BP: ()/(53-54) 92/53   Pulse: Heart Rate:  [83-94] 83   Respirations: Resp:  [16-18] 16   SPO2: SpO2:  [97 %-99 %] 97 %   O2 Amount (l/min):     O2 Devices Device (Oxygen Therapy): room air   Weight:       Admit Height:  Height: 157.5 cm (62\")      Physical Exam:  General:  no acute distresss.  Abdomen: Fundus: appropriate, firm, non tender  Extremities: normal, atraumatic, no cyanosis, and trace edema.     Lab results reviewed:  Yes   Rubella:  No results found for: RUBELLAIGGIN Nurse Transcribed from prenatal record --  No components found for: EXTRUBELQUAL  Rh Status:    RH type   Date Value Ref Range Status   07/23/2018 Positive  Final     Immunizations:   Immunization History   Administered Date(s) Administered   • Tdap 07/24/2018       Assessment/Plan     Active Problems:    * No active hospital problems. *      Nely De La Fuente is Day 2  post-partum  Vaginal, Spontaneous Delivery    .      Plan:  Discharge home with standard precautions and return to clinic in 4-6 weeks.      Narda Corbett MD  7/25/2018  12:09 PM  "

## 2018-07-25 NOTE — DISCHARGE SUMMARY
Discharge Summary     Swati De La Fuente  : 1995  MRN: [id number]  CSN: [id number]    Date of Admission: 2018   Date of Discharge:  2018   Delivering Physician: Narda Corbett        Admission Diagnosis: 1. Threatened labor at term [O47.9]  2. Third trimester pregnancy [Z34.93]  3. Shoulder dystocia during labor and delivery, delivered [O66.0]   Discharge Diagnosis: 1. Pregnancy at 38w1d - delivered       Procedures: 2018  - Vaginal, Spontaneous Delivery       Hospital Course  Patient is a 23 y.o.  who at 38w1d had a uncomplicated vaginal delivery.  Her postpartum course was without complications.  On PPD #2 she was ready for discharge.  She had normal lochia and pain was well controlled with oral medications.    Infant  male  fetus weighing 3900 g (8 lb 9.6 oz)   Apgars -  7  @ 1 minute /  9  @ 5 minutes.    Discharge labs  Lab Results   Component Value Date    WBC 16.11 (H) 2018    HGB 9.8 (L) 2018    HCT 29.6 (L) 2018     2018       Discharge Medications     Discharge Medications      New Medications      Instructions Start Date   ibuprofen 800 MG tablet  Commonly known as:  ADVIL,MOTRIN   800 mg, Oral, Every 8 Hours PRN      oxyCODONE-acetaminophen 5-325 MG per tablet  Commonly known as:  PERCOCET   1 tablet, Oral, Every 6 Hours PRN         Continue These Medications      Instructions Start Date   cyclobenzaprine 5 MG tablet  Commonly known as:  FLEXERIL   5 mg, Oral, 3 Times Daily PRN      prenatal (CLASSIC) vitamin 28-0.8 MG tablet tablet   1 tablet, Oral, Daily         Stop These Medications    amoxicillin-clavulanate 875-125 MG per tablet  Commonly known as:  AUGMENTIN     JUNEL FE  1-20 MG-MCG per tablet  Generic drug:  norethindrone-ethinyl estradiol FE     naproxen 500 MG tablet  Commonly known as:  NAPROSYN            Discharge Disposition Home or Self Care   Condition on Discharge: good   Follow-up: 4 weeks with Dr. Corbett, if  no appointment, please call office     Narda Corbett MD  7/25/2018

## 2018-07-25 NOTE — PLAN OF CARE
Problem: Patient Care Overview  Goal: Plan of Care Review  Outcome: Ongoing (interventions implemented as appropriate)   07/25/18 0305   Coping/Psychosocial   Plan of Care Reviewed With patient   Plan of Care Review   Progress improving   OTHER   Outcome Summary vss, voiding, ambulating, ff ml u1, scant lochia, pumping, visiting infant in nicu often     Goal: Individualization and Mutuality  Outcome: Ongoing (interventions implemented as appropriate)   07/24/18 1831   Individualization   Patient Specific Preferences visits nicu often   Patient Specific Goals (Include Timeframe) pump breast   Patient Specific Interventions encourage and assist as needed   Mutuality/Individual Preferences   What Anxieties, Fears, Concerns, or Questions Do You Have About Your Care? denies   What Information Would Help Us Give You More Personalized Care? review care plan with patient   How Would You and/or Your Support Person Like to Participate in Your Care? review care plan with patient   Mutuality/Individual Preferences   How to Address Anxieties/Fears na     Goal: Discharge Needs Assessment  Outcome: Ongoing (interventions implemented as appropriate)    Goal: Interprofessional Rounds/Family Conf  Outcome: Ongoing (interventions implemented as appropriate)      Problem: Postpartum (Vaginal Delivery) (Adult,Obstetrics,Pediatric)  Goal: Signs and Symptoms of Listed Potential Problems Will be Absent, Minimized or Managed (Postpartum)  Outcome: Ongoing (interventions implemented as appropriate)   07/25/18 0305   Goal/Outcome Evaluation   Problems Assessed (Postpartum Vaginal Delivery) all   Problems Present (Postpartum Vag Deliv) pain       Problem: Breastfeeding (Adult,Obstetrics,Pediatric)  Goal: Signs and Symptoms of Listed Potential Problems Will be Absent, Minimized or Managed (Breastfeeding)  Outcome: Ongoing (interventions implemented as appropriate)   07/25/18 0305   Goal/Outcome Evaluation   Problems Assessed (Breastfeeding) all    Problems Present (Breastfeeding) pain

## 2018-07-25 NOTE — LACTATION NOTE
This note was copied from a baby's chart.  37 6/7 week infant, Fransisco, delivered 18 @ 1206. Birth weight 8-9.6 (3900 g). Current weight 8-5.7 (3790g).  Weight loss -2.8%.      Feeding orders:  40 mL EBM, may BF ad liliana or PO feed if RR < 65.      Called to NICU to assist with feeding. Infant rooting once placed on mother's chest. Hand expressed several drops of colostrum into infant's mouth. Infant latched after several attempts using small nipple shield. Intermittent sucking and colostrum noted in nipple shield. Infant easily fatigued,  for 5 minutes. Mother reports she is trying to pump every 3 hours. She collected 5 ml with last pumping session. Discussed hands on pumping, setting an alarm to pump, power pumping, signs of a good feeding, and collecting milk in sterile containers. Recommended at least 8 pumping sessions every 24 hours. Encouragement and support provided. Mother verbalized understanding. Questions denied.      Maternal Hx: , Asthma,   Prenatal Meds: Augmentin, Flexeril, Naproxen, PNV, Junel FE  Pump: yes

## 2018-07-26 NOTE — PAYOR COMM NOTE
"OR HOME 7-25-18    [id number]  Nely De La Fuente  (23 y.o. Female)     Date of Birth Social Security Number Address Home Phone MRN    1995  [address] [facility] [address] [facility] [id number] [phone] [id number]    Buddhist Marital Status          Other Single       Admission Date Admission Type Admitting Provider Attending Provider Department, Room/Bed    7/23/18 Elective Narda Corbett MD  [facility] MOTHER BABY 2A, M223/1    Discharge Date Discharge Disposition Discharge Destination        7/25/2018 Home or Self Care Home             Attending Provider:  (none)   Allergies:  No Known Allergies    Isolation:  None   Infection:  None   Code Status:  Prior    Ht:  157.5 cm (62\")   Wt:  84.8 kg (187 lb)    Admission Cmt:  None   Principal Problem:  None                Active Insurance as of 7/23/2018     Primary Coverage     Payor Plan Insurance Group Employer/Plan Group    HUMANA HUMANA [id number]     Payor Plan Address Payor Plan Phone Number Effective From Effective To    [address] [address] [phone] 6/1/2017     [facility] [id number]       Subscriber Name Subscriber Birth Date Member ID       NELY DE LA FUENTE 1995 [id number]                 Emergency Contacts      (Rel.) Home Phone Work Phone Mobile Phone    Charleen De La Fuente (Mother) [phone] -- --               Operative/Procedure Notes (all)      Narda Corbett MD at 7/23/2018 12:37 PM  Version 1 of [address] [place] [address]  Vaginal Delivery Note    Delivery     Delivery: Vaginal, Spontaneous Delivery     YOB: 2020    Time of Birth: 12:06 PM      Anesthesia: Epidural     Delivering clinician: Narda Corbett    Forceps?   No   Vacuum? No    Shoulder dystocia present: Yes Shoulder Dystocia Details  Dystocia Present? Yes   Time head delivered:     Gentle attempt at traction, assisted by maternal expulsive forces: Yes   If no, why:     Anterior shoulder:     Time recognized: 7/23/2018 12:05 PM "     Time help called: 2018 12:00 PM  Called by: Dr. Corbett/AIME Taylor    Time provider arrived: 2018 12:00 PM  Provider who arrived: Dr. Corbett    Time NICU arrived: 2018 12:00 PM  NICU staff: CORNELIUS Friend/Kathleen Castrejon RN    Time additional staff arrived: 2018 12:05 PM  Additional staff:             Delivery narrative:  Shoulder dystocia, delivered with becky, woods screw & SP pressure    Infant    Findings: male  infant     Infant observations: Weight: 3900 g (8 lb 9.6 oz)   Length: 21  in  Observations/Comments:         Apgars: 7   @ 1 minute /    9   @ 5 minutes   Infant Name:      Placenta, Cord, and Fluid    Placenta delivered  Spontaneous  at   2018 12:15 PM     Cord: 3 vessels  present.   Nuchal Cord?  no   Cord blood obtained: Yes    Cord gases obtained:  Yes    Cord gas results: Venous:  No results found for: PHCVEN    Arterial:  No results found for: PHCART     Repair    Episiotomy: None    Lacerations: Yes  Laceration Information  Laceration Repaired?   Perineal: 1st  Yes    Periurethral:         Labial:         Sulcus:         Vaginal: No       Cervical: No          Suture used for repair: 2-0 Vicryl    Estimated Blood Loss: Est. Blood Loss (mL): 300 mL (Filed from Delivery Summary) (18 1206)           Complications  Shoulder dystocia, and chorioamnionitis    Disposition  Mother to Mother Baby/Postpartum  in stable condition currently.  Baby to NICU  in stable condition currently.      Narda Corbett MD  18  7:10 AM        Electronically signed by Narda Corbett MD at 2018  7:11 AM          Discharge Summary      Narda Corbett MD at 2018 12:11 PM          Discharge Summary     Swati De La Fuente  : 1995  MRN: [id number]  CSN: [id number]    Date of Admission: 2018   Date of Discharge:  2018   Delivering Physician: Narda Corbett        Admission Diagnosis: 1. Threatened labor at term  [O47.9]  2. Third trimester pregnancy [Z34.93]  3. Shoulder dystocia during labor and delivery, delivered [O66.0]   Discharge Diagnosis: 1. Pregnancy at 38w1d - delivered       Procedures: 2018  - Vaginal, Spontaneous Delivery       Hospital Course  Patient is a 23 y.o.  who at 38w1d had a uncomplicated vaginal delivery.  Her postpartum course was without complications.  On PPD #2 she was ready for discharge.  She had normal lochia and pain was well controlled with oral medications.    Infant  male  fetus weighing 3900 g (8 lb 9.6 oz)   Apgars -  7  @ 1 minute /  9  @ 5 minutes.    Discharge labs  Lab Results   Component Value Date    WBC 16.11 (H) 2018    HGB 9.8 (L) 2018    HCT 29.6 (L) 2018     2018       Discharge Medications     Discharge Medications      New Medications      Instructions Start Date   ibuprofen 800 MG tablet  Commonly known as:  ADVIL,MOTRIN   800 mg, Oral, Every 8 Hours PRN      oxyCODONE-acetaminophen 5-325 MG per tablet  Commonly known as:  PERCOCET   1 tablet, Oral, Every 6 Hours PRN         Continue These Medications      Instructions Start Date   cyclobenzaprine 5 MG tablet  Commonly known as:  FLEXERIL   5 mg, Oral, 3 Times Daily PRN      prenatal (CLASSIC) vitamin 28-0.8 MG tablet tablet   1 tablet, Oral, Daily         Stop These Medications    amoxicillin-clavulanate 875-125 MG per tablet  Commonly known as:  AUGMENTIN     JUNEL FE  1-20 MG-MCG per tablet  Generic drug:  norethindrone-ethinyl estradiol FE     naproxen 500 MG tablet  Commonly known as:  NAPROSYN            Discharge Disposition Home or Self Care   Condition on Discharge: good   Follow-up: 4 weeks with Dr. Corbett, if no appointment, please call office     Narda Corbett MD  2018    Electronically signed by Narda Corbett MD at 2018 12:11 PM

## 2018-09-12 ENCOUNTER — LAB (OUTPATIENT)
Dept: LAB | Facility: HOSPITAL | Age: 23
End: 2018-09-12

## 2018-09-12 ENCOUNTER — TRANSCRIBE ORDERS (OUTPATIENT)
Dept: ADMINISTRATIVE | Facility: HOSPITAL | Age: 23
End: 2018-09-12

## 2018-09-12 DIAGNOSIS — R50.9 FEVER, UNSPECIFIED FEVER CAUSE: ICD-10-CM

## 2018-09-12 DIAGNOSIS — R50.9 FEVER, UNSPECIFIED FEVER CAUSE: Primary | ICD-10-CM

## 2018-09-12 DIAGNOSIS — R52 PAIN: ICD-10-CM

## 2018-09-12 LAB
BACTERIA UR QL AUTO: ABNORMAL /HPF
BILIRUB UR QL STRIP: NEGATIVE
CLARITY UR: ABNORMAL
COLOR UR: YELLOW
FLUAV AG NPH QL: NEGATIVE
FLUBV AG NPH QL IA: NEGATIVE
GLUCOSE UR STRIP-MCNC: NEGATIVE MG/DL
HGB UR QL STRIP.AUTO: NEGATIVE
HYALINE CASTS UR QL AUTO: ABNORMAL /LPF
KETONES UR QL STRIP: ABNORMAL
LEUKOCYTE ESTERASE UR QL STRIP.AUTO: NEGATIVE
MUCOUS THREADS URNS QL MICRO: ABNORMAL /HPF
NITRITE UR QL STRIP: NEGATIVE
PH UR STRIP.AUTO: 6 [PH] (ref 5–8)
PROT UR QL STRIP: ABNORMAL
RBC # UR: ABNORMAL /HPF
REF LAB TEST METHOD: ABNORMAL
S PYO AG THROAT QL: NEGATIVE
SP GR UR STRIP: >=1.03 (ref 1–1.03)
SQUAMOUS #/AREA URNS HPF: ABNORMAL /HPF
TRICHOMONAS #/AREA URNS HPF: ABNORMAL /HPF
UROBILINOGEN UR QL STRIP: ABNORMAL
WBC UR QL AUTO: ABNORMAL /HPF

## 2018-09-12 PROCEDURE — 81001 URINALYSIS AUTO W/SCOPE: CPT

## 2018-09-12 PROCEDURE — 87081 CULTURE SCREEN ONLY: CPT | Performed by: NURSE PRACTITIONER

## 2018-09-12 PROCEDURE — 87804 INFLUENZA ASSAY W/OPTIC: CPT

## 2018-09-12 PROCEDURE — 87880 STREP A ASSAY W/OPTIC: CPT

## 2018-09-12 PROCEDURE — 87086 URINE CULTURE/COLONY COUNT: CPT | Performed by: NURSE PRACTITIONER

## 2018-09-15 LAB
BACTERIA SPEC AEROBE CULT: ABNORMAL
BACTERIA SPEC AEROBE CULT: NORMAL

## 2023-06-12 ENCOUNTER — REFERRAL TRIAGE (OUTPATIENT)
Dept: LABOR AND DELIVERY | Facility: HOSPITAL | Age: 28
End: 2023-06-12
Payer: MEDICAID

## 2023-06-12 ENCOUNTER — PATIENT OUTREACH (OUTPATIENT)
Dept: LABOR AND DELIVERY | Facility: HOSPITAL | Age: 28
End: 2023-06-12
Payer: MEDICAID

## 2023-06-12 NOTE — OUTREACH NOTE
[facility]  Unable to Reach       Questions/Answers      Flowsheet Row Responses   Pending Outreach Confirm Patient Interest   Call Attempt First   Outcome No answer/busy, Left message                Ashlee Barragan RN  Maternity Nurse Navigator    6/12/2023, 14:21 CDT

## 2023-06-13 ENCOUNTER — PATIENT OUTREACH (OUTPATIENT)
Dept: LABOR AND DELIVERY | Facility: HOSPITAL | Age: 28
End: 2023-06-13
Payer: MEDICAID

## 2023-06-13 NOTE — OUTREACH NOTE
[facility]  Enrollment    Current Estimated Gestational Age: 14w6d    Questions/Answers      Flowsheet Row Responses   Would like to participate? No  [miscarriage]                Ashlee Barragan RN  Maternity Nurse Navigator    6/13/2023, 12:46 CDT

## 2023-11-13 LAB — EXTERNAL HEPATITIS B SURFACE ANTIGEN: NEGATIVE

## 2024-03-26 ENCOUNTER — INITIAL PRENATAL (OUTPATIENT)
Age: 29
End: 2024-03-26
Payer: MEDICAID

## 2024-03-26 VITALS — DIASTOLIC BLOOD PRESSURE: 68 MMHG | BODY MASS INDEX: 31.4 KG/M2 | WEIGHT: 171.7 LBS | SYSTOLIC BLOOD PRESSURE: 130 MMHG

## 2024-03-26 DIAGNOSIS — Z3A.24 24 WEEKS GESTATION OF PREGNANCY: Primary | ICD-10-CM

## 2024-03-26 DIAGNOSIS — Z34.82 PRENATAL CARE, SUBSEQUENT PREGNANCY, SECOND TRIMESTER: ICD-10-CM

## 2024-03-26 DIAGNOSIS — O09.292 PRIOR SHOULDER DYSTOCIA AT DELIVERY IN SECOND TRIMESTER, ANTEPARTUM: ICD-10-CM

## 2024-03-26 LAB
GLUCOSE UR STRIP-MCNC: NEGATIVE MG/DL
PROT UR STRIP-MCNC: NEGATIVE MG/DL

## 2024-03-26 PROCEDURE — 99203 OFFICE O/P NEW LOW 30 MIN: CPT | Performed by: OBSTETRICS & GYNECOLOGY

## 2024-03-26 RX ORDER — PRENATAL WITH FERROUS FUM AND FOLIC ACID 3080; 920; 120; 400; 22; 1.84; 3; 20; 10; 1; 12; 200; 27; 25; 2 [IU]/1; [IU]/1; MG/1; [IU]/1; MG/1; MG/1; MG/1; MG/1; MG/1; MG/1; UG/1; MG/1; MG/1; MG/1; MG/1
1 TABLET ORAL DAILY
COMMUNITY
Start: 2024-02-27

## 2024-03-26 NOTE — PROGRESS NOTES
[facility]   HISTORY AND PHYSICAL  Subjective   Subjective     Chief Complaint:   Chief Complaint   Patient presents with    Establish Care     Pt here to establish care with Dr Edwards, prev Dr Corbett pt. Pt denies any problems, feels good fetal movement.       History of Present Illness  Nely De La Fuente is a 29 y.o. female  who presents for transfer of care from Dr. Corbett. Pt with uncomplicated pregnancy. Pt with prior shoulder dystocia with last pregnancy resolved with Maria R, wood cork screw.  No residual erbs, clavicle fx. Pt and I discussed shoulder dystocia. Pt was 37 weeks with SROM with babys weight of 8#9oz.  Pt does not opt for primary c/s. No complaints today. GFM. Denies VB, LOF, vaginal discharge. Pt currently taking a daily PNV.     Review of Systems     Personal History     OB History    Para Term  AB Living   3 0 0 0 1 1   SAB IAB Ectopic Molar Multiple Live Births   1 0 0 0 0 0      # Outcome Date GA Lbr Main/2nd Weight Sex Type Anes PTL Lv   3 Current            2 SAB            1       Vag-Spont        Past Medical History:   Diagnosis Date    Asthma     only with activity     History reviewed. No pertinent surgical history.    Home Medications:  Prenatal 27-    Allergies:  She has No Known Allergies.    Objective    Objective     Vitals:   BP: (130)/(68) 130/68    Physical Exam  deferred  Result Review    [unfilled]    Assessment & Plan   Assessment / Plan     Diagnoses and all orders for this visit:    1. 24 weeks gestation of pregnancy (Primary)  -     POC Urinalysis Dipstick    2. Prior shoulder dystocia at delivery in second trimester, antepartum    3. Prenatal care, subsequent pregnancy, second trimester        Discussion:   Transfer of care. EDC by LMP c/w 6 week US.  Prior uncomplicated pregnancy.  Vaginal delivery with shoulder dystocia. Pt and I discussed shoulder dystocia.   New OB labs normal  Tdap next visit  1 hour GTT next visit.   Questions  answered  RTC 4 weeks    Return in about 4 weeks (around 4/23/2024) for 4d US next visit with 1 hour GTT.    Charleen Edwards MD

## 2024-03-27 ENCOUNTER — PATIENT OUTREACH (OUTPATIENT)
Dept: LABOR AND DELIVERY | Facility: HOSPITAL | Age: 29
End: 2024-03-27
Payer: MEDICAID

## 2024-03-27 ENCOUNTER — REFERRAL TRIAGE (OUTPATIENT)
Dept: LABOR AND DELIVERY | Facility: HOSPITAL | Age: 29
End: 2024-03-27
Payer: MEDICAID

## 2024-03-27 NOTE — OUTREACH NOTE
[facility]  Enrollment    Current Estimated Gestational Age: 24w2d    Questions/Answers      Flowsheet Row Responses   Would like to participate? Yes   Date of Intake Visit 03/27/24            [facility]  Intake    Current Estimated Gestational Age: 24w2d    Intake Assessment      Flowsheet Row Responses   Best Method for Contacting Cell   Currently Employed Yes  []   Able to keep appointments as scheduled Yes   Gender(s) and Name(s) female   Do you have a dentist? Yes   Resources Presently Utilizing: None   Maternal Warning Signs Provided   Other: Provided   Other Education HANDS, How to find a dentist, How to find a pediatrician, How to find a primary care provider, Insurance benefits/Incentives, Meds to Beds, Mental Health Services, Smoking/Vaping Cessation, SNAP Benefits, Substance Use Disorder Treatment, Transportation Assistance, WIC Benefits            Learning Assessment      Flowsheet Row Responses   Relationship Patient   Does the learner have any barriers to learning? No Barriers   What is the preferred language of the learner for medical teaching? English   How does the learner prefer to learn new concepts? Listening, Reading, Demonstration, Pictures/Video            SDOH updated and reviewed with the patient during this program:  --     Alcohol Use: Not At Risk (3/27/2024)    AUDIT-C     Frequency of Alcohol Consumption: Never     Average Number of Drinks: Patient does not drink     Frequency of Binge Drinking: Never      --     Depression: Not at risk (3/27/2024)    PHQ-2     PHQ-2 Score: 0      --     Disabilities: Not At Risk (3/27/2024)    Disabilities     Concentrating, Remembering, or Making Decisions Difficulty: no     Doing Errands Independently Difficulty: no      --     Employment: Not At Risk (3/27/2024)    Employment     Do you want help finding or keeping work or a job?: I do not need or want help      Financial Resource Strain: Low Risk  (3/27/2024)     Overall Financial Resource Strain (CARDIA)     Difficulty of Paying Living Expenses: Not very hard      --     Food Insecurity: No Food Insecurity (3/27/2024)    Hunger Vital Sign     Worried About Running Out of Food in the Last Year: Never true     Ran Out of Food in the Last Year: Never true      --     Health Literacy: Not At Risk (3/27/2024)    Education     Help with school or training?: No     Preferred Language: English      --     Housing Stability: Not At Risk (3/27/2024)    Housing Stability     Current Living Arrangements: home     Potentially Unsafe Housing Conditions: none      --     Interpersonal Safety: Unknown (3/27/2024)    Abuse Screen     Unsafe at Home or Work/School: no     Feels Threatened by Someone?: no     Does Anyone Keep You from Contacting Others or Doint Things Outside the Home?: no      --     Physical Activity: Insufficiently Active (3/27/2024)    Exercise Vital Sign     Days of Exercise per Week: 2 days     Minutes of Exercise per Session: 40 min      --     Social Connections: Not At Risk (3/27/2024)    Family and Community Support     Help with Day-to-Day Activities: I don't need any help     Lonely or Isolated: Rarely      --     Transportation Needs: No Transportation Needs (3/27/2024)    PRAPARE - Transportation     Lack of Transportation (Medical): No     Lack of Transportation (Non-Medical): No      --     Utilities: Not At Risk (3/27/2024)    [facility] Utilities     Threatened with loss of utilities: No       Resource letter sent to Nely in [facility].    Tobacco, Alcohol, and Drug History     reports that she has never smoked. She has never used smokeless tobacco.   reports no history of alcohol use.   reports no history of drug use.    sAhlee Barragan RN  Maternity Nurse Navigator    3/27/2024, 13:49 CDT            Ashlee Barragan RN  Maternity Nurse Navigator    3/27/2024, 13:49 CDT

## 2024-04-01 ENCOUNTER — TELEPHONE (OUTPATIENT)
Dept: OBSTETRICS AND GYNECOLOGY | Age: 29
End: 2024-04-01
Payer: MEDICAID

## 2024-04-01 NOTE — TELEPHONE ENCOUNTER
Caller: Sudhakar Nely    Relationship: Self    Best call back number: [phone]    What form or medical record are you requesting: DENTAL CLEARANCE LETTER     Who is requesting this form or medical record from you: DENTIST     How would you like to receive the form or medical records (pick-up, mail, fax): FAX  If fax, what is the fax number: [phone]  If mail, what is the address:   If pick-up, provide patient with address and location details    Timeframe paperwork needed: ASAP PTS APPT IS TODAY     Additional notes: PT NEEDS DENTAL CLEARANCE LETTER FAXED OVER TO THE DENTIST FOR HER APPT TODAY AT 11

## 2024-04-22 ENCOUNTER — ROUTINE PRENATAL (OUTPATIENT)
Age: 29
End: 2024-04-22
Payer: MEDICAID

## 2024-04-22 VITALS — DIASTOLIC BLOOD PRESSURE: 76 MMHG | SYSTOLIC BLOOD PRESSURE: 118 MMHG | BODY MASS INDEX: 32.74 KG/M2 | WEIGHT: 179 LBS

## 2024-04-22 DIAGNOSIS — Z3A.28 28 WEEKS GESTATION OF PREGNANCY: Primary | ICD-10-CM

## 2024-04-22 DIAGNOSIS — O09.293 PRIOR SHOULDER DYSTOCIA AT DELIVERY IN THIRD TRIMESTER, ANTEPARTUM: ICD-10-CM

## 2024-04-22 LAB
GLUCOSE UR STRIP-MCNC: NEGATIVE MG/DL
PROT UR STRIP-MCNC: NEGATIVE MG/DL

## 2024-04-22 PROCEDURE — 99213 OFFICE O/P EST LOW 20 MIN: CPT | Performed by: OBSTETRICS & GYNECOLOGY

## 2024-04-22 NOTE — PROGRESS NOTES
No complaints. GFM. Denies VB, LOF, ctx.  Did not eat today prior to coming so [facility] will not give her the 1hour GTT. Pt to come in the am.    /76   Wt 81.2 kg (179 lb)   LMP 08/17/2022 (Approximate)   BMI 32.74 kg/m²    FHTs 151  Urine protein and glucose negative    Diagnoses and all orders for this visit:    1. 28 weeks gestation of pregnancy (Primary)  -     POC Urinalysis Dipstick  -     Gestational Screen 1 Hr ([facility])  -     RPR, Rfx Qn RPR / Confirm TP  -     CBC (No Diff)  IUP at 28 weeks gestation, doing well. 1 hour GTT in am.  RTC 2 weeks. Growth at 32 weeks due to prior shoulder dystocia.   2. Prior shoulder dystocia at delivery in third trimester, antepartum

## 2024-04-23 ENCOUNTER — PATIENT OUTREACH (OUTPATIENT)
Dept: LABOR AND DELIVERY | Facility: HOSPITAL | Age: 29
End: 2024-04-23
Payer: MEDICAID

## 2024-04-24 ENCOUNTER — PATIENT OUTREACH (OUTPATIENT)
Dept: LABOR AND DELIVERY | Facility: HOSPITAL | Age: 29
End: 2024-04-24
Payer: MEDICAID

## 2024-04-24 LAB
ERYTHROCYTE [DISTWIDTH] IN BLOOD BY AUTOMATED COUNT: 11.8 % (ref 11.7–15.4)
GLUCOSE 1H P 50 G GLC PO SERPL-MCNC: 176 MG/DL (ref 70–139)
HCT VFR BLD AUTO: 33.6 % (ref 34–46.6)
HGB BLD-MCNC: 11.5 G/DL (ref 11.1–15.9)
MCH RBC QN AUTO: 31.1 PG (ref 26.6–33)
MCHC RBC AUTO-ENTMCNC: 34.2 G/DL (ref 31.5–35.7)
MCV RBC AUTO: 91 FL (ref 79–97)
PLATELET # BLD AUTO: 357 X10E3/UL (ref 150–450)
RBC # BLD AUTO: 3.7 X10E6/UL (ref 3.77–5.28)
RPR SER QL: NON REACTIVE
WBC # BLD AUTO: 8 X10E3/UL (ref 3.4–10.8)

## 2024-04-24 NOTE — OUTREACH NOTE
[facility]  Unable to Reach       Questions/Answers      Flowsheet Row Responses   Pending Outreach Prenatal Check-in   Call Attempt First   Outcome No answer/busy                Ashlee Barragan RN  Maternity Nurse Navigator    4/24/2024, 10:49 CDT

## 2024-04-26 ENCOUNTER — PATIENT OUTREACH (OUTPATIENT)
Dept: LABOR AND DELIVERY | Facility: HOSPITAL | Age: 29
End: 2024-04-26
Payer: MEDICAID

## 2024-04-26 NOTE — OUTREACH NOTE
[facility]  Check-In    Current Estimated Gestational Age: 28w4d      Questions/Answers      Flowsheet Row Responses   Best Method for Contacting Cell   Currently Employed Yes   Able to keep appointments as scheduled Yes   Gender(s) and Name(s) female          Resource letter sent to Nely Urbina.     Ashlee Barragan RN  Maternity Nurse Navigator    4/26/2024, 10:30 CDT

## 2024-04-29 DIAGNOSIS — O99.810 ABNORMAL GLUCOSE IN PREGNANCY, ANTEPARTUM: Primary | ICD-10-CM

## 2024-04-30 LAB
GLUCOSE 1H P 100 G GLC PO SERPL-MCNC: 193 MG/DL (ref 70–179)
GLUCOSE 2H P 100 G GLC PO SERPL-MCNC: 146 MG/DL (ref 70–154)
GLUCOSE 3H P 100 G GLC PO SERPL-MCNC: 60 MG/DL (ref 70–139)
GLUCOSE P FAST SERPL-MCNC: 93 MG/DL (ref 70–94)
Lab: ABNORMAL

## 2024-05-06 ENCOUNTER — ROUTINE PRENATAL (OUTPATIENT)
Age: 29
End: 2024-05-06
Payer: MEDICAID

## 2024-05-06 VITALS — BODY MASS INDEX: 33.42 KG/M2 | WEIGHT: 182.7 LBS | SYSTOLIC BLOOD PRESSURE: 120 MMHG | DIASTOLIC BLOOD PRESSURE: 80 MMHG

## 2024-05-06 DIAGNOSIS — Z3A.30 30 WEEKS GESTATION OF PREGNANCY: Primary | ICD-10-CM

## 2024-05-06 DIAGNOSIS — O09.293 PRIOR SHOULDER DYSTOCIA AT DELIVERY IN THIRD TRIMESTER, ANTEPARTUM: ICD-10-CM

## 2024-05-06 LAB
GLUCOSE UR STRIP-MCNC: NEGATIVE MG/DL
PROT UR STRIP-MCNC: NEGATIVE MG/DL

## 2024-05-06 PROCEDURE — 99213 OFFICE O/P EST LOW 20 MIN: CPT | Performed by: OBSTETRICS & GYNECOLOGY

## 2024-05-20 ENCOUNTER — ROUTINE PRENATAL (OUTPATIENT)
Age: 29
End: 2024-05-20
Payer: MEDICAID

## 2024-05-20 VITALS — WEIGHT: 185 LBS | BODY MASS INDEX: 33.84 KG/M2 | DIASTOLIC BLOOD PRESSURE: 74 MMHG | SYSTOLIC BLOOD PRESSURE: 136 MMHG

## 2024-05-20 DIAGNOSIS — Z3A.32 32 WEEKS GESTATION OF PREGNANCY: Primary | ICD-10-CM

## 2024-05-20 LAB
GLUCOSE UR STRIP-MCNC: NEGATIVE MG/DL
PROT UR STRIP-MCNC: NEGATIVE MG/DL

## 2024-05-20 NOTE — PROGRESS NOTES
Reason for visit: Routine OB visit at 32w0d     CC:  Feeling good.. Endorses regular fetal movement. Denies VB, LOF, contractions, h/a, visual changes, and right upper quadrant pain.     ROS: All systems reviewed and are negative with exception of the following: amenorrhea    Wt 83.9 kg (185 lb) lb for a TWG of Not found., /74, FHTs 137, FH 34  Urine today and reviewed: neg glucose, neg protein      US today: EFW: 2325g 92.4%; vertex, posterior placenta with no evidence of placenta previa    Exam:  General Appearance:  No visualized signs of distress. Normal mood and behavior  Cardiorespiratory: HR str and reg. Lungs clear. Resp even and unlabored.  Abdomen: is soft and nontender. No CVA tenderness. Uterus is round and non-tender.  Ext: none edema. Calves non-tender.     Impression  Diagnoses and all orders for this visit:    1. 32 weeks gestation of pregnancy (Primary)  Discussed third trimester of pregnancy, discomforts and measures of support, fetal movement counts,  labor warnings, preeclampsia warnings, and signs and symptoms to report. Discussed and encouraged to come to the hospital or call with vaginal bleeding, leaking of fluid, contractions, or other concerns. Third Trimester of Pregnancy, Signs and Symptoms of Labor, and Alternative Pain Management During Labor and Delivery videos included in the AVS.   -     POC Urinalysis Dipstick          Return to the office in 2 weeks for routine follow up and as needed with concerns.        This note has been signed electronically.    DANIEL Corona, ERASMO

## 2024-06-03 ENCOUNTER — ROUTINE PRENATAL (OUTPATIENT)
Age: 29
End: 2024-06-03
Payer: MEDICAID

## 2024-06-03 VITALS — WEIGHT: 191 LBS | BODY MASS INDEX: 34.93 KG/M2 | DIASTOLIC BLOOD PRESSURE: 70 MMHG | SYSTOLIC BLOOD PRESSURE: 118 MMHG

## 2024-06-03 DIAGNOSIS — O09.293 PRIOR SHOULDER DYSTOCIA AT DELIVERY IN THIRD TRIMESTER, ANTEPARTUM: ICD-10-CM

## 2024-06-03 DIAGNOSIS — Z3A.34 34 WEEKS GESTATION OF PREGNANCY: Primary | ICD-10-CM

## 2024-06-03 LAB
GLUCOSE UR STRIP-MCNC: NEGATIVE MG/DL
PROT UR STRIP-MCNC: ABNORMAL MG/DL

## 2024-06-03 PROCEDURE — 99213 OFFICE O/P EST LOW 20 MIN: CPT | Performed by: OBSTETRICS & GYNECOLOGY

## 2024-06-03 NOTE — PROGRESS NOTES
Went to [facility] yesterday and baby is breech.  Pt with 8+ pound baby with shoulder dystocia. US for growth and presentation at her next visit. GBS negative visit. GFM. Denies VB, LOF, ctx.    /70   Wt 86.6 kg (191 lb)   LMP 08/17/2022 (Approximate)   BMI 34.93 kg/m²    FHTs 149  Urine protein trace, urine glucose negative    Diagnoses and all orders for this visit:    1. 34 weeks gestation of pregnancy (Primary)  -     POC Urinalysis Dipstick  IUP at 34 weeks gestation, doing well. Breech at [facility] B[facility] yesterday. US in 2 weeks for presentation and growth. PTL precautions.   2. Prior shoulder dystocia at delivery in third trimester, antepartum    3. Maternal care for breech presentation, single gestation

## 2024-06-17 ENCOUNTER — PATIENT OUTREACH (OUTPATIENT)
Dept: LABOR AND DELIVERY | Facility: HOSPITAL | Age: 29
End: 2024-06-17
Payer: MEDICAID

## 2024-06-17 ENCOUNTER — HOSPITAL ENCOUNTER (OUTPATIENT)
Facility: HOSPITAL | Age: 29
Discharge: HOME | End: 2024-06-17
Attending: OBSTETRICS & GYNECOLOGY | Admitting: OBSTETRICS & GYNECOLOGY
Payer: MEDICAID

## 2024-06-17 ENCOUNTER — ROUTINE PRENATAL (OUTPATIENT)
Age: 29
End: 2024-06-17
Payer: MEDICAID

## 2024-06-17 VITALS — BODY MASS INDEX: 36.23 KG/M2 | WEIGHT: 198.1 LBS | SYSTOLIC BLOOD PRESSURE: 122 MMHG | DIASTOLIC BLOOD PRESSURE: 82 MMHG

## 2024-06-17 DIAGNOSIS — Z34.83 PRENATAL CARE, SUBSEQUENT PREGNANCY, THIRD TRIMESTER: ICD-10-CM

## 2024-06-17 DIAGNOSIS — Z3A.36 36 WEEKS GESTATION OF PREGNANCY: Primary | ICD-10-CM

## 2024-06-17 LAB
GLUCOSE UR STRIP-MCNC: NEGATIVE MG/DL
PROT UR STRIP-MCNC: ABNORMAL MG/DL

## 2024-06-17 PROCEDURE — 99213 OFFICE O/P EST LOW 20 MIN: CPT | Performed by: OBSTETRICS & GYNECOLOGY

## 2024-06-17 PROCEDURE — G0463 HOSPITAL OUTPT CLINIC VISIT: HCPCS

## 2024-06-17 PROCEDURE — G0378 HOSPITAL OBSERVATION PER HR: HCPCS

## 2024-06-17 NOTE — OUTREACH NOTE
[facility]  Check-In    Current Estimated Gestational Age: 36w0d      EPDS questionnaire sent to Nely in [facility] prior to our telephone check-in this week.     Ashlee Barragan RN  Maternity Nurse Navigator    6/17/2024, 13:58 CDT

## 2024-06-17 NOTE — NON STRESS TEST
Nely De La Fuente, a  at 36w0d with an ANSELMO of 7/15/2024, by Ultrasound, was seen at [facility] LABOR DELIVERY for a nonstress test.    Chief Complaint   Patient presents with    Decreased Fetal Movement       Patient Active Problem List   Diagnosis   (none) - all problems resolved or deleted       Start Time: 1124  Stop Time: 1144    Interpretation A  Nonstress Test Interpretation A: Reactive (verified with adin arias rn)

## 2024-06-17 NOTE — PROGRESS NOTES
Complaints of decreased FM. Denies VB, LOF, ctx.  US today in breech presentation, BPP 8/8, ANGEL 20 cm. EFW 98% at 3656g.  Shoulder dystocia with last pregnancy. This baby is at the same weight as her last pregnancy at delivery.  Having swelling at the end of the day and carpal tunnel symptoms    /82   Wt 89.9 kg (198 lb 1.6 oz)   LMP 08/17/2022 (Approximate)   BMI 36.23 kg/m²    FHTs 141  Urine protein trace, urine glucose negative    Diagnoses and all orders for this visit:    1. 36 weeks gestation of pregnancy (Primary)  -     POC Urinalysis Dipstick  -     Strep B Screen - , Vaginal/Rectum  -     Chlamydia trachomatis, Neisseria gonorrhoeae, PCR w/ confirmation - Swab, Vagina    2. Prenatal care, subsequent pregnancy, third trimester    3. Maternal care for breech presentation, single gestation  IUP at 36 weeks gestation. To OB for NST. BPP 8/8.  Breech presentation. Recheck at next visit. In light of prior shoulder dystocia and breech, would proceed with primary c/s at 39 weeks.

## 2024-06-18 ENCOUNTER — PATIENT OUTREACH (OUTPATIENT)
Dept: LABOR AND DELIVERY | Facility: HOSPITAL | Age: 29
End: 2024-06-18
Payer: MEDICAID

## 2024-06-18 NOTE — OUTREACH NOTE
[facility]  Unable to Reach       Questions/Answers      Flowsheet Row Responses   Pending Outreach Prenatal Check-in   Call Attempt First   Outcome No answer/busy                Ashlee Barragan RN  Maternity Nurse Navigator    6/18/2024, 10:50 CDT

## 2024-06-19 ENCOUNTER — PATIENT OUTREACH (OUTPATIENT)
Dept: LABOR AND DELIVERY | Facility: HOSPITAL | Age: 29
End: 2024-06-19
Payer: MEDICAID

## 2024-06-19 LAB
C TRACH RRNA SPEC QL NAA+PROBE: NEGATIVE
GP B STREP DNA SPEC QL NAA+PROBE: NEGATIVE
N GONORRHOEA RRNA SPEC QL NAA+PROBE: NEGATIVE

## 2024-06-19 NOTE — OUTREACH NOTE
[facility]  Unable to Reach       Questions/Answers      Flowsheet Row Responses   Pending Outreach Prenatal Check-in   Call Attempt Second   Outcome No answer/busy, Left message                Ashlee Barragan RN  Maternity Nurse Navigator    6/19/2024, 09:42 CDT

## 2024-06-20 ENCOUNTER — PATIENT OUTREACH (OUTPATIENT)
Dept: LABOR AND DELIVERY | Facility: HOSPITAL | Age: 29
End: 2024-06-20
Payer: MEDICAID

## 2024-06-20 NOTE — OUTREACH NOTE
[facility]  Check-In    Current Estimated Gestational Age: 36w3d      Resource letter sent to Nely Urbina.     Ashlee Barragan RN  Maternity Nurse Navigator    6/20/2024, 12:56 CDT

## 2024-06-24 ENCOUNTER — ROUTINE PRENATAL (OUTPATIENT)
Age: 29
End: 2024-06-24
Payer: MEDICAID

## 2024-06-24 VITALS — BODY MASS INDEX: 36.07 KG/M2 | WEIGHT: 197.2 LBS | DIASTOLIC BLOOD PRESSURE: 80 MMHG | SYSTOLIC BLOOD PRESSURE: 108 MMHG

## 2024-06-24 DIAGNOSIS — O09.293 PRIOR SHOULDER DYSTOCIA AT DELIVERY IN THIRD TRIMESTER, ANTEPARTUM: ICD-10-CM

## 2024-06-24 DIAGNOSIS — Z3A.37 37 WEEKS GESTATION OF PREGNANCY: Primary | ICD-10-CM

## 2024-06-24 LAB
GLUCOSE UR STRIP-MCNC: NEGATIVE MG/DL
PROT UR STRIP-MCNC: ABNORMAL MG/DL

## 2024-06-24 PROCEDURE — 99213 OFFICE O/P EST LOW 20 MIN: CPT | Performed by: OBSTETRICS & GYNECOLOGY

## 2024-06-24 NOTE — PROGRESS NOTES
US today and baby still breech. GFM. Denies VB, LOF, ctx.     /80   Wt 89.4 kg (197 lb 3.2 oz)   LMP 08/17/2022 (Approximate)   BMI 36.07 kg/m²    FHTs 149  Urine protein trace, urine glucose negative    US breech, ANGEL 17.7 cm     Diagnoses and all orders for this visit:    1. 37 weeks gestation of pregnancy (Primary)  -     POC Urinalysis Dipstick    2. Maternal care for breech presentation, single gestation  Breech presentation with prior shoulder dystocia. Pt scheduled for 7/8 for primary c/s for delivery. If pt had not had a prior shoulder dystocia, then we would discuss external version further. RTC 1 week with US for presentation.   3. Prior shoulder dystocia at delivery in third trimester, antepartum

## 2024-07-05 ENCOUNTER — TELEPHONE (OUTPATIENT)
Age: 29
End: 2024-07-05
Payer: MEDICAID

## 2024-07-05 ENCOUNTER — ROUTINE PRENATAL (OUTPATIENT)
Age: 29
End: 2024-07-05
Payer: MEDICAID

## 2024-07-05 VITALS — BODY MASS INDEX: 36.4 KG/M2 | WEIGHT: 199 LBS | SYSTOLIC BLOOD PRESSURE: 120 MMHG | DIASTOLIC BLOOD PRESSURE: 78 MMHG

## 2024-07-05 DIAGNOSIS — O09.293 PRIOR SHOULDER DYSTOCIA AT DELIVERY IN THIRD TRIMESTER, ANTEPARTUM: ICD-10-CM

## 2024-07-05 DIAGNOSIS — Z3A.38 38 WEEKS GESTATION OF PREGNANCY: Primary | ICD-10-CM

## 2024-07-05 LAB
GLUCOSE UR STRIP-MCNC: NEGATIVE MG/DL
PROT UR STRIP-MCNC: ABNORMAL MG/DL

## 2024-07-05 NOTE — TELEPHONE ENCOUNTER
Primary C/S scheduled for 7-9-2024, arrive at 0530 in LDR, NPO after midnight. ERAS and surgery instructions given. Pt informed.

## 2024-07-05 NOTE — PROGRESS NOTES
Reason for visit: Routine OB visit at 38w4d     CC:  Feeling okay but a little crampy. No edema present now but has some in the evenings. Endorses decreased fetal movement prior to appointment but it has since improved. Denies VB, LOF, contractions, h/a, visual changes, and right upper quadrant pain.     ROS: All systems reviewed and are negative with exception of the following: cramping, amenorrhea    Wt 90.3 kg (199 lb) lb for a TWG of 12.7 kg (28 lb), /78, FHTs 144, FH 39  Urine today and reviewed: neg glucose, tr protein    US today: BPP 8/8, ANGEL 18.4, DVP 11.6; breech, posterior placenta    Exam:  General Appearance:  No visualized signs of distress. Normal mood and behavior  Cardiorespiratory: HR str and reg. Lungs clear. Resp even and unlabored.  Abdomen: is soft and nontender. No CVA tenderness. Uterus is round and soft.  Ext: No edema. Calves non-tender.     Impression  Diagnoses and all orders for this visit:    1. 38 weeks gestation of pregnancy (Primary)  Discussed third trimester discomforts and measures of support, fetal movement counts, signs of labor, preeclampsia warnings, and signs and symptoms to report. Discussed and encouraged to come to the hospital or call with vaginal bleeding, leaking of fluid, regular contractions, or other concerns. Signs and Symptoms of Labor and Preparing for  videos included in the AVS.   -     POC Urinalysis Dipstick    2. Maternal care for breech presentation, single gestation  Discussed what to expect for a  birth and immediate postpartum. MIKHAIL Mosley RN educated on ERAS protocol w/pt.  -     Case Request    3. Prior shoulder dystocia at delivery in third trimester, antepartum      Return to the office in 1 week for routine follow up and as needed with concerns.    This note has been signed electronically.  DANIEL Corona, ERASMO

## 2024-07-07 ENCOUNTER — ANESTHESIA EVENT (OUTPATIENT)
Dept: LABOR AND DELIVERY | Facility: HOSPITAL | Age: 29
End: 2024-07-07
Payer: MEDICAID

## 2024-07-08 ENCOUNTER — HOSPITAL ENCOUNTER (INPATIENT)
Facility: HOSPITAL | Age: 29
LOS: 1 days | Discharge: HOME | End: 2024-07-09
Attending: OBSTETRICS & GYNECOLOGY | Admitting: OBSTETRICS & GYNECOLOGY
Payer: MEDICAID

## 2024-07-08 DIAGNOSIS — Z98.891 S/P CESAREAN SECTION: Primary | ICD-10-CM

## 2024-07-08 PROBLEM — O42.90 LEAKAGE OF AMNIOTIC FLUID: Status: ACTIVE | Noted: 2024-07-08

## 2024-07-08 LAB
ABO GROUP BLD: NORMAL
BLD GP AB SCN SERPL QL: NEGATIVE
DEPRECATED RDW RBC AUTO: 43.3 FL (ref 37–54)
ERYTHROCYTE [DISTWIDTH] IN BLOOD BY AUTOMATED COUNT: 14.3 % (ref 12.3–15.4)
HCT VFR BLD AUTO: 35.4 % (ref 34–46.6)
HGB BLD-MCNC: 11.7 G/DL (ref 12–15.9)
MCH RBC QN AUTO: 28.1 PG (ref 26.6–33)
MCHC RBC AUTO-ENTMCNC: 33.1 G/DL (ref 31.5–35.7)
MCV RBC AUTO: 84.9 FL (ref 79–97)
PLATELET # BLD AUTO: 274 10*3/MM3 (ref 140–450)
PMV BLD AUTO: 9.7 FL (ref 6–12)
RBC # BLD AUTO: 4.17 10*6/MM3 (ref 3.77–5.28)
RH BLD: POSITIVE
T&S EXPIRATION DATE: NORMAL
TREPONEMA PALLIDUM IGG+IGM AB [PRESENCE] IN SERUM OR PLASMA BY IMMUNOASSAY: NORMAL
WBC NRBC COR # BLD AUTO: 9.02 10*3/MM3 (ref 3.4–10.8)

## 2024-07-08 PROCEDURE — 86850 RBC ANTIBODY SCREEN: CPT | Performed by: OBSTETRICS & GYNECOLOGY

## 2024-07-08 PROCEDURE — 25010000002 ONDANSETRON PER 1 MG: Performed by: NURSE ANESTHETIST, CERTIFIED REGISTERED

## 2024-07-08 PROCEDURE — 25810000003 LACTATED RINGERS PER 1000 ML: Performed by: OBSTETRICS & GYNECOLOGY

## 2024-07-08 PROCEDURE — 25010000002 CEFAZOLIN PER 500 MG: Performed by: OBSTETRICS & GYNECOLOGY

## 2024-07-08 PROCEDURE — 59515 CESAREAN DELIVERY: CPT | Performed by: OBSTETRICS & GYNECOLOGY

## 2024-07-08 PROCEDURE — 86780 TREPONEMA PALLIDUM: CPT | Performed by: OBSTETRICS & GYNECOLOGY

## 2024-07-08 PROCEDURE — 25010000002 OXYTOCIN PER 10 UNITS: Performed by: NURSE ANESTHETIST, CERTIFIED REGISTERED

## 2024-07-08 PROCEDURE — 88307 TISSUE EXAM BY PATHOLOGIST: CPT | Performed by: OBSTETRICS & GYNECOLOGY

## 2024-07-08 PROCEDURE — 25010000002 BUPIVACAINE PF 0.75 % SOLUTION: Performed by: NURSE ANESTHETIST, CERTIFIED REGISTERED

## 2024-07-08 PROCEDURE — 25010000002 DEXAMETHASONE PER 1 MG: Performed by: NURSE ANESTHETIST, CERTIFIED REGISTERED

## 2024-07-08 PROCEDURE — 25010000002 KETOROLAC TROMETHAMINE PER 15 MG: Performed by: OBSTETRICS & GYNECOLOGY

## 2024-07-08 PROCEDURE — 25010000002 METOCLOPRAMIDE PER 10 MG: Performed by: OBSTETRICS & GYNECOLOGY

## 2024-07-08 PROCEDURE — 86901 BLOOD TYPING SEROLOGIC RH(D): CPT | Performed by: OBSTETRICS & GYNECOLOGY

## 2024-07-08 PROCEDURE — 25010000002 HYDROMORPHONE 1 MG/ML SOLUTION: Performed by: NURSE ANESTHETIST, CERTIFIED REGISTERED

## 2024-07-08 PROCEDURE — 25810000003 SODIUM CHLORIDE 0.9 % SOLUTION 250 ML FLEX CONT: Performed by: OBSTETRICS & GYNECOLOGY

## 2024-07-08 PROCEDURE — 25010000002 AZITHROMYCIN PER 500 MG: Performed by: OBSTETRICS & GYNECOLOGY

## 2024-07-08 PROCEDURE — 85027 COMPLETE CBC AUTOMATED: CPT | Performed by: OBSTETRICS & GYNECOLOGY

## 2024-07-08 PROCEDURE — 86900 BLOOD TYPING SEROLOGIC ABO: CPT | Performed by: OBSTETRICS & GYNECOLOGY

## 2024-07-08 RX ORDER — KETOROLAC TROMETHAMINE 30 MG/ML
30 INJECTION, SOLUTION INTRAMUSCULAR; INTRAVENOUS EVERY 6 HOURS
Status: DISCONTINUED | OUTPATIENT
Start: 2024-07-08 | End: 2024-07-08

## 2024-07-08 RX ORDER — IBUPROFEN 600 MG/1
600 TABLET ORAL EVERY 6 HOURS
Status: DISCONTINUED | OUTPATIENT
Start: 2024-07-09 | End: 2024-07-08

## 2024-07-08 RX ORDER — KETOROLAC TROMETHAMINE 30 MG/ML
30 INJECTION, SOLUTION INTRAMUSCULAR; INTRAVENOUS ONCE
Status: COMPLETED | OUTPATIENT
Start: 2024-07-08 | End: 2024-07-08

## 2024-07-08 RX ORDER — DEXAMETHASONE SODIUM PHOSPHATE 4 MG/ML
INJECTION, SOLUTION INTRA-ARTICULAR; INTRALESIONAL; INTRAMUSCULAR; INTRAVENOUS; SOFT TISSUE AS NEEDED
Status: DISCONTINUED | OUTPATIENT
Start: 2024-07-08 | End: 2024-07-08 | Stop reason: SURG

## 2024-07-08 RX ORDER — ACETAMINOPHEN 325 MG/1
650 TABLET ORAL EVERY 6 HOURS
Status: DISCONTINUED | OUTPATIENT
Start: 2024-07-09 | End: 2024-07-09 | Stop reason: HOSPADM

## 2024-07-08 RX ORDER — METHYLERGONOVINE MALEATE 0.2 MG/ML
200 INJECTION INTRAVENOUS ONCE AS NEEDED
Status: DISCONTINUED | OUTPATIENT
Start: 2024-07-08 | End: 2024-07-08 | Stop reason: HOSPADM

## 2024-07-08 RX ORDER — OXYTOCIN 10 [USP'U]/ML
INJECTION, SOLUTION INTRAMUSCULAR; INTRAVENOUS AS NEEDED
Status: DISCONTINUED | OUTPATIENT
Start: 2024-07-08 | End: 2024-07-08 | Stop reason: SURG

## 2024-07-08 RX ORDER — FAMOTIDINE 10 MG/ML
20 INJECTION, SOLUTION INTRAVENOUS ONCE AS NEEDED
Status: COMPLETED | OUTPATIENT
Start: 2024-07-08 | End: 2024-07-08

## 2024-07-08 RX ORDER — OXYCODONE HYDROCHLORIDE 5 MG/1
5 TABLET ORAL EVERY 4 HOURS PRN
Status: DISCONTINUED | OUTPATIENT
Start: 2024-07-08 | End: 2024-07-09 | Stop reason: HOSPADM

## 2024-07-08 RX ORDER — OXYCODONE HYDROCHLORIDE 5 MG/1
10 TABLET ORAL EVERY 4 HOURS PRN
Status: DISCONTINUED | OUTPATIENT
Start: 2024-07-08 | End: 2024-07-09 | Stop reason: HOSPADM

## 2024-07-08 RX ORDER — ACETAMINOPHEN 500 MG
1000 TABLET ORAL EVERY 6 HOURS
Status: DISPENSED | OUTPATIENT
Start: 2024-07-08 | End: 2024-07-09

## 2024-07-08 RX ORDER — SODIUM CHLORIDE 9 MG/ML
40 INJECTION, SOLUTION INTRAVENOUS AS NEEDED
Status: DISCONTINUED | OUTPATIENT
Start: 2024-07-08 | End: 2024-07-08 | Stop reason: HOSPADM

## 2024-07-08 RX ORDER — NALBUPHINE HYDROCHLORIDE 10 MG/ML
2.5 INJECTION, SOLUTION INTRAMUSCULAR; INTRAVENOUS; SUBCUTANEOUS EVERY 4 HOURS PRN
Status: ACTIVE | OUTPATIENT
Start: 2024-07-08 | End: 2024-07-09

## 2024-07-08 RX ORDER — ONDANSETRON 2 MG/ML
INJECTION INTRAMUSCULAR; INTRAVENOUS AS NEEDED
Status: DISCONTINUED | OUTPATIENT
Start: 2024-07-08 | End: 2024-07-08 | Stop reason: SURG

## 2024-07-08 RX ORDER — DROPERIDOL 2.5 MG/ML
0.62 INJECTION, SOLUTION INTRAMUSCULAR; INTRAVENOUS ONCE AS NEEDED
Status: DISCONTINUED | OUTPATIENT
Start: 2024-07-08 | End: 2024-07-09 | Stop reason: HOSPADM

## 2024-07-08 RX ORDER — OXYTOCIN/0.9 % SODIUM CHLORIDE 30/500 ML
999 PLASTIC BAG, INJECTION (ML) INTRAVENOUS ONCE
Status: DISCONTINUED | OUTPATIENT
Start: 2024-07-08 | End: 2024-07-08 | Stop reason: HOSPADM

## 2024-07-08 RX ORDER — CARBOPROST TROMETHAMINE 250 UG/ML
250 INJECTION, SOLUTION INTRAMUSCULAR
Status: DISCONTINUED | OUTPATIENT
Start: 2024-07-08 | End: 2024-07-08 | Stop reason: HOSPADM

## 2024-07-08 RX ORDER — PHENYLEPHRINE HCL IN 0.9% NACL 1 MG/10 ML
SYRINGE (ML) INTRAVENOUS AS NEEDED
Status: DISCONTINUED | OUTPATIENT
Start: 2024-07-08 | End: 2024-07-08 | Stop reason: SURG

## 2024-07-08 RX ORDER — BISACODYL 10 MG
10 SUPPOSITORY, RECTAL RECTAL DAILY PRN
Status: DISCONTINUED | OUTPATIENT
Start: 2024-07-08 | End: 2024-07-09 | Stop reason: HOSPADM

## 2024-07-08 RX ORDER — BISACODYL 5 MG/1
10 TABLET, DELAYED RELEASE ORAL DAILY PRN
Status: DISCONTINUED | OUTPATIENT
Start: 2024-07-08 | End: 2024-07-09 | Stop reason: HOSPADM

## 2024-07-08 RX ORDER — KETOROLAC TROMETHAMINE 30 MG/ML
30 INJECTION, SOLUTION INTRAMUSCULAR; INTRAVENOUS EVERY 6 HOURS
Status: COMPLETED | OUTPATIENT
Start: 2024-07-09 | End: 2024-07-09

## 2024-07-08 RX ORDER — OXYTOCIN/0.9 % SODIUM CHLORIDE 30/500 ML
125 PLASTIC BAG, INJECTION (ML) INTRAVENOUS ONCE AS NEEDED
Status: DISCONTINUED | OUTPATIENT
Start: 2024-07-08 | End: 2024-07-09 | Stop reason: HOSPADM

## 2024-07-08 RX ORDER — METOCLOPRAMIDE HYDROCHLORIDE 5 MG/ML
10 INJECTION INTRAMUSCULAR; INTRAVENOUS ONCE AS NEEDED
Status: COMPLETED | OUTPATIENT
Start: 2024-07-08 | End: 2024-07-08

## 2024-07-08 RX ORDER — BUPIVACAINE HYDROCHLORIDE 7.5 MG/ML
INJECTION, SOLUTION EPIDURAL; RETROBULBAR
Status: COMPLETED | OUTPATIENT
Start: 2024-07-08 | End: 2024-07-08

## 2024-07-08 RX ORDER — METHYLERGONOVINE MALEATE 0.2 MG/ML
200 INJECTION INTRAVENOUS AS NEEDED
Status: DISCONTINUED | OUTPATIENT
Start: 2024-07-08 | End: 2024-07-09 | Stop reason: HOSPADM

## 2024-07-08 RX ORDER — SODIUM CHLORIDE, SODIUM LACTATE, POTASSIUM CHLORIDE, CALCIUM CHLORIDE 600; 310; 30; 20 MG/100ML; MG/100ML; MG/100ML; MG/100ML
125 INJECTION, SOLUTION INTRAVENOUS CONTINUOUS
Status: DISCONTINUED | OUTPATIENT
Start: 2024-07-08 | End: 2024-07-08

## 2024-07-08 RX ORDER — IBUPROFEN 600 MG/1
600 TABLET ORAL EVERY 6 HOURS
Status: DISCONTINUED | OUTPATIENT
Start: 2024-07-09 | End: 2024-07-09 | Stop reason: HOSPADM

## 2024-07-08 RX ORDER — HYDROMORPHONE HYDROCHLORIDE 1 MG/ML
0.5 INJECTION, SOLUTION INTRAMUSCULAR; INTRAVENOUS; SUBCUTANEOUS
Status: DISCONTINUED | OUTPATIENT
Start: 2024-07-08 | End: 2024-07-09 | Stop reason: HOSPADM

## 2024-07-08 RX ORDER — ONDANSETRON 4 MG/1
4 TABLET, ORALLY DISINTEGRATING ORAL EVERY 8 HOURS PRN
Status: DISCONTINUED | OUTPATIENT
Start: 2024-07-08 | End: 2024-07-09 | Stop reason: HOSPADM

## 2024-07-08 RX ORDER — OXYTOCIN/0.9 % SODIUM CHLORIDE 30/500 ML
250 PLASTIC BAG, INJECTION (ML) INTRAVENOUS CONTINUOUS
Status: ACTIVE | OUTPATIENT
Start: 2024-07-08 | End: 2024-07-08

## 2024-07-08 RX ORDER — HYDROXYZINE HYDROCHLORIDE 25 MG/1
50 TABLET, FILM COATED ORAL EVERY 6 HOURS PRN
Status: DISCONTINUED | OUTPATIENT
Start: 2024-07-08 | End: 2024-07-09 | Stop reason: HOSPADM

## 2024-07-08 RX ORDER — CARBOPROST TROMETHAMINE 250 UG/ML
250 INJECTION, SOLUTION INTRAMUSCULAR ONCE AS NEEDED
Status: DISCONTINUED | OUTPATIENT
Start: 2024-07-08 | End: 2024-07-09 | Stop reason: HOSPADM

## 2024-07-08 RX ORDER — PRENATAL VIT/IRON FUM/FOLIC AC 27MG-0.8MG
1 TABLET ORAL DAILY
Status: DISCONTINUED | OUTPATIENT
Start: 2024-07-08 | End: 2024-07-09 | Stop reason: HOSPADM

## 2024-07-08 RX ORDER — ONDANSETRON 2 MG/ML
4 INJECTION INTRAMUSCULAR; INTRAVENOUS EVERY 6 HOURS PRN
Status: DISCONTINUED | OUTPATIENT
Start: 2024-07-08 | End: 2024-07-09 | Stop reason: HOSPADM

## 2024-07-08 RX ORDER — MISOPROSTOL 200 UG/1
800 TABLET ORAL ONCE AS NEEDED
Status: COMPLETED | OUTPATIENT
Start: 2024-07-08 | End: 2024-07-08

## 2024-07-08 RX ORDER — ACETAMINOPHEN 500 MG
1000 TABLET ORAL ONCE
Status: COMPLETED | OUTPATIENT
Start: 2024-07-08 | End: 2024-07-08

## 2024-07-08 RX ORDER — NALOXONE HCL 0.4 MG/ML
0.1 VIAL (ML) INJECTION
Status: DISCONTINUED | OUTPATIENT
Start: 2024-07-08 | End: 2024-07-09 | Stop reason: HOSPADM

## 2024-07-08 RX ORDER — SODIUM CHLORIDE 0.9 % (FLUSH) 0.9 %
10 SYRINGE (ML) INJECTION AS NEEDED
Status: DISCONTINUED | OUTPATIENT
Start: 2024-07-08 | End: 2024-07-08 | Stop reason: HOSPADM

## 2024-07-08 RX ORDER — ENOXAPARIN SODIUM 100 MG/ML
40 INJECTION SUBCUTANEOUS NIGHTLY
Status: DISCONTINUED | OUTPATIENT
Start: 2024-07-09 | End: 2024-07-09 | Stop reason: HOSPADM

## 2024-07-08 RX ORDER — MISOPROSTOL 200 UG/1
800 TABLET ORAL ONCE AS NEEDED
Status: DISCONTINUED | OUTPATIENT
Start: 2024-07-08 | End: 2024-07-09 | Stop reason: HOSPADM

## 2024-07-08 RX ORDER — NALOXONE HCL 0.4 MG/ML
0.4 VIAL (ML) INJECTION
Status: ACTIVE | OUTPATIENT
Start: 2024-07-08 | End: 2024-07-09

## 2024-07-08 RX ORDER — SIMETHICONE 80 MG
80 TABLET,CHEWABLE ORAL 4 TIMES DAILY PRN
Status: DISCONTINUED | OUTPATIENT
Start: 2024-07-08 | End: 2024-07-09 | Stop reason: HOSPADM

## 2024-07-08 RX ORDER — SODIUM CHLORIDE 0.9 % (FLUSH) 0.9 %
10 SYRINGE (ML) INJECTION EVERY 12 HOURS SCHEDULED
Status: DISCONTINUED | OUTPATIENT
Start: 2024-07-08 | End: 2024-07-08 | Stop reason: HOSPADM

## 2024-07-08 RX ORDER — CITRIC ACID/SODIUM CITRATE 334-500MG
30 SOLUTION, ORAL ORAL ONCE
Status: COMPLETED | OUTPATIENT
Start: 2024-07-08 | End: 2024-07-08

## 2024-07-08 RX ORDER — DOCUSATE SODIUM 100 MG/1
100 CAPSULE, LIQUID FILLED ORAL 2 TIMES DAILY PRN
Status: DISCONTINUED | OUTPATIENT
Start: 2024-07-08 | End: 2024-07-09 | Stop reason: HOSPADM

## 2024-07-08 RX ADMIN — OXYTOCIN 40 UNITS: 10 INJECTION INTRAVENOUS at 03:40

## 2024-07-08 RX ADMIN — KETOROLAC TROMETHAMINE 30 MG: 30 INJECTION, SOLUTION INTRAMUSCULAR; INTRAVENOUS at 23:39

## 2024-07-08 RX ADMIN — Medication 200 MCG: at 03:22

## 2024-07-08 RX ADMIN — DEXAMETHASONE SODIUM PHOSPHATE 4 MG: 4 INJECTION INTRA-ARTICULAR; INTRALESIONAL; INTRAMUSCULAR; INTRAVENOUS; SOFT TISSUE at 03:29

## 2024-07-08 RX ADMIN — OXYCODONE HYDROCHLORIDE 5 MG: 5 TABLET ORAL at 23:38

## 2024-07-08 RX ADMIN — AZITHROMYCIN DIHYDRATE 500 MG: 500 INJECTION, POWDER, LYOPHILIZED, FOR SOLUTION INTRAVENOUS at 04:52

## 2024-07-08 RX ADMIN — KETOROLAC TROMETHAMINE 30 MG: 30 INJECTION, SOLUTION INTRAMUSCULAR; INTRAVENOUS at 17:38

## 2024-07-08 RX ADMIN — CEFAZOLIN 2000 MG: 2 INJECTION, POWDER, FOR SOLUTION INTRAMUSCULAR; INTRAVENOUS at 03:19

## 2024-07-08 RX ADMIN — ACETAMINOPHEN 1000 MG: 500 TABLET, FILM COATED ORAL at 02:48

## 2024-07-08 RX ADMIN — OXYCODONE HYDROCHLORIDE 5 MG: 5 TABLET ORAL at 18:29

## 2024-07-08 RX ADMIN — ACETAMINOPHEN 1000 MG: 500 TABLET, FILM COATED ORAL at 09:29

## 2024-07-08 RX ADMIN — ONDANSETRON 4 MG: 2 INJECTION INTRAMUSCULAR; INTRAVENOUS at 03:29

## 2024-07-08 RX ADMIN — SODIUM CHLORIDE, POTASSIUM CHLORIDE, SODIUM LACTATE AND CALCIUM CHLORIDE 125 ML/HR: 600; 310; 30; 20 INJECTION, SOLUTION INTRAVENOUS at 02:41

## 2024-07-08 RX ADMIN — MISOPROSTOL 800 MCG: 200 TABLET ORAL at 04:09

## 2024-07-08 RX ADMIN — ACETAMINOPHEN 1000 MG: 500 TABLET, FILM COATED ORAL at 21:38

## 2024-07-08 RX ADMIN — BUPIVACAINE HYDROCHLORIDE 1.5 ML: 7.5 INJECTION, SOLUTION EPIDURAL; RETROBULBAR at 03:18

## 2024-07-08 RX ADMIN — KETOROLAC TROMETHAMINE 30 MG: 30 INJECTION, SOLUTION INTRAMUSCULAR; INTRAVENOUS at 11:08

## 2024-07-08 RX ADMIN — FAMOTIDINE 20 MG: 10 INJECTION INTRAVENOUS at 03:09

## 2024-07-08 RX ADMIN — PRENATAL VIT W/ FE FUMARATE-FA TAB 27-0.8 MG 1 TABLET: 27-0.8 TAB at 09:29

## 2024-07-08 RX ADMIN — HYDROMORPHONE HYDROCHLORIDE 900 MCG: 1 INJECTION, SOLUTION INTRAMUSCULAR; INTRAVENOUS; SUBCUTANEOUS at 03:40

## 2024-07-08 RX ADMIN — HYDROMORPHONE HYDROCHLORIDE 100 MCG: 1 INJECTION, SOLUTION INTRAMUSCULAR; INTRAVENOUS; SUBCUTANEOUS at 03:18

## 2024-07-08 RX ADMIN — SODIUM CITRATE AND CITRIC ACID MONOHYDRATE 30 ML: 500; 334 SOLUTION ORAL at 03:09

## 2024-07-08 RX ADMIN — METOCLOPRAMIDE HYDROCHLORIDE 10 MG: 5 INJECTION INTRAMUSCULAR; INTRAVENOUS at 03:09

## 2024-07-08 RX ADMIN — Medication 250 ML/HR: at 04:52

## 2024-07-08 RX ADMIN — ACETAMINOPHEN 1000 MG: 500 TABLET, FILM COATED ORAL at 15:16

## 2024-07-08 RX ADMIN — KETOROLAC TROMETHAMINE 30 MG: 30 INJECTION, SOLUTION INTRAMUSCULAR; INTRAVENOUS at 04:51

## 2024-07-08 NOTE — PAYOR COMM NOTE
"REF:   [id number]    [facility]  [place]VASHI,   [phone]  OR  FAX    [phone]     Nely De La Fuente (29 y.o. Female)       Date of Birth   1995    Social Security Number       Address   [address] [place] [place] [address]    Home Phone   [phone]    MRN   [id number]       Judaism   Other    Marital Status   Single                            Admission Date   24    Admission Type   Elective    Admitting Provider   Aquiles Bautista MD    Attending Provider   Charleen Edwards MD    Department, Room/Bed   [facility] MOTHER BABY 2A, M264/1       Discharge Date       Discharge Disposition       Discharge Destination                                 Attending Provider: Charleen Edwards MD    Allergies: No Known Allergies    Isolation: None   Infection: None   Code Status: CPR    Ht: 157.5 cm (62\")   Wt: 88.9 kg (196 lb)    Admission Cmt: None   Principal Problem: Maternal care for breech presentation, single gestation [O32.1XX0]                   Active Insurance as of 2024       Primary Coverage       Payor Plan Insurance Group Employer/Plan Group    [place] [id number]       Payor Plan Address Payor Plan Phone Number Payor Plan Fax Number Effective Dates    [employer] [address] [address] [phone]  2021 - None Entered    [facility], located within [facility] [address]         Subscriber Name Subscriber Birth Date Member ID       NELY DE LA FUENTE 1995 [id number]                     Emergency Contacts        (Rel.) Home Phone Work Phone Mobile Phone    SudhakarCharleen (Mother) [phone] -- --    MIRYAM SEGUNDO (Significant Other) -- -- [phone]     EDC 7/15/2024   G-3 P-2   39 WKS GESTATIONAL AGE       2024 AT  3:36 AM      FEMALE    4300 GRAMS IN WEIGHT   APGAR 5/9         Operative/Procedure Notes (last 24 hours)        Aquiles Bautista MD at 24 0327          [facility]   Section Operative Note    Pre-Operative Dx: " "  1.  IUP at Gestational Age: 39w0d  weeks    2.  Breech    3.  Labor status:  Without Labor   4. SROM     Postoperative dx:    1.  Same     Procedure: Procedure(s):   SECTION PRIMARY   Surgeon/Assistant: Surgeon(s):  Aqulies Bautista MD         Anesthesia:  Anesthesiologist: Choice  CRNA: Farhat Torres CRNA         QBL:  569 mL        IV Fluids: 1000 mls.   UOP: 100 mls., clear    I/O this shift:  In: -   Out: 569 [Blood:569]   Antibiotics: cefazolin (Ancef), azithromycin     Infant:           Gender: female  infant    Weight: 4300 g (9 lb 7.7 oz)     Apgars: 5  @ 1 minute /     9  @ 5 minutes    Cord gases: Venous:  No results found for: \"PHCVEN\", \"BECVEN\"     Arterial:  No results found for: \"PHCART\", \"BECART\"     Indication for C/Section:  Breech         Priority for C/Section:  routine      Procedure Details:   After informed consent was obtained, the patient was taken to the operating room where  Spinal anesthesia was administered. A time out procedure was completed, confirming the correct patient and correct procedure. Perioperative antibiotics were administered. She was placed in the supine position with leftward tilt and her abdomen was prepped and draped in normal sterile fashion after a Crowell catheter was placed by nursing staff.     After confirming adequate anesthesia, a Pfannenstiel incision was made in the skin with the surgical scalpel. Sharp dissection was carried out over the subsequent layers of tissue down to the fascia at midline. The fascial incision was extended laterally in both directions with fascial stretch. The rectus muscles were divided at midline and the peritoneum was then opened with blunt dissection at its upper margin. The peritoneum was then stretched by pulling caudally and cranially. All layers of the abdominal wall were manually stretched laterally to provide an opening as large as the skin incision.  An Arun self-retaining retractor was then inserted, " taking care to avoid entrapping the bowel.     A bladder flap was created.  A low-transverse incision was made in the lower uterine segment using the scalpel. The uterine incision was extended bilaterally using blunt dissection in a cranial-caudad stretch. The amniotic sac was already ruptured.    The surgeon’s hand was placed into the uterine cavity. The fetal posterior identified. Footling breech with foot in the vagina. Right lower extremity delivered followed by left lower extremity. Fetus with terminal meconium. Abdomen and trunk delivered followed by the left then right upper extremities. Head then delivered with ease.  No nuchal cord was identified . The infant was stunned on the field. The cord was clamped and cut x2. The infant was then passed off the table to the awaiting NICU team for further care. Cord blood & gases were obtained for analysis and routine blood testing and the placenta was expressed.    Oxytocin was administered by IV infusion to enhance uterine contraction. The uterus was cleared of all clots and remaining products of conception, and then repaired in situ. The uterine incision was closed with #0-Vicryl in a running, locked fashion. A second imbricating stitch with a #0-Vicryl was used to obtain excellent hemostasis. Good hemostasis was confirmed. Surgicel powder was applied for added hemostasis.     The pericolic gutters were cleared of all clots.  The peritoneum followed by the rectus muscles were reapproximated with 2-O Vicryl.  The fascia was reapproximated using #0-Vicryl in a running non-locked fashion. The subcutaneous layer was reapproximated with #2-O vicryl in a running, non-locked fashion. The skin was reapproximated using #4-O Monocryl. The incision was reinforced using Skin glue. Cytotec 800 mcg was placed rectally following the procedure.     All sponge, needle, and instrument counts were noted to be correct ×3 at the end of the procedure.     Operative Findings:  Normal  appearing uterus, ovaries, and fallopian tubes bilaterally.         Complications:   None      Disposition:   Mother to Mother Baby/Postpartum  in stable condition currently.   Baby to remains with mom  in stable condition currently.       Aquiles Bautista MD  7/8/2024  04:12 CDT        Electronically signed by Aquiles Bautista MD at 07/08/24 0416

## 2024-07-08 NOTE — ANESTHESIA PREPROCEDURE EVALUATION
Anesthesia Evaluation     Patient summary reviewed and Nursing notes reviewed                Airway   Mallampati: I  TM distance: >3 FB  Neck ROM: full  No difficulty expected  Dental - normal exam     Pulmonary - negative pulmonary ROS and normal exam   Cardiovascular - negative cardio ROS and normal exam  Exercise tolerance: good (4-7 METS)        Neuro/Psych- negative ROS  GI/Hepatic/Renal/Endo - negative ROS     Musculoskeletal (-) negative ROS    Abdominal  - normal exam    Bowel sounds: normal.   Substance History - negative use     OB/GYN negative ob/gyn ROS         Other - negative ROS                   Anesthesia Plan    ASA 2 - emergent     spinal       Anesthetic plan, risks, benefits, and alternatives have been provided, discussed and informed consent has been obtained with: patient.    CODE STATUS:    Level Of Support Discussed With: Patient  Code Status (Patient has no pulse and is not breathing): CPR (Attempt to Resuscitate)  Medical Interventions (Patient has pulse or is breathing): Full Support

## 2024-07-08 NOTE — ANESTHESIA PROCEDURE NOTES
Spinal Block      Patient reassessed immediately prior to procedure    Patient location during procedure: OB  Indication:at surgeon's request, post-op pain management and procedure for pain  Preanesthetic Checklist  Completed: patient identified, IV checked, site marked, risks and benefits discussed, surgical consent, monitors and equipment checked, pre-op evaluation and timeout performed  Spinal Block Prep:  Patient Position:sitting  Sterile Tech:cap, gloves and sterile barriers  Patient Monitoring:EKG, continuous pulse oximetry and blood pressure monitoring    Spinal Block Procedure  Approach:midline  Guidance:palpation technique  Location:L4-L5  Needle Type:Cheryl  Needle Gauge:25 G  Placement of Spinal needle event:cerebrospinal fluid aspirated  Paresthesia: left and transient  Fluid Appearance:clear  Medications: bupivacaine PF (MARCAINE) 0.75% - Intrathecal   1.5 mL - 7/8/2024 3:18:00 AM   Post Assessment  Patient Tolerance:patient tolerated the procedure well with no apparent complications  Complications no

## 2024-07-08 NOTE — PLAN OF CARE
Goal Outcome Evaluation:  Plan of Care Reviewed With: patient        Progress: improving  Outcome Evaluation: Patient delivered baby girl at 0336 via primary c/s. PP Pitocin infusing

## 2024-07-08 NOTE — OP NOTE
"[facility]   Section Operative Note    Pre-Operative Dx:   1.  IUP at Gestational Age: 39w0d  weeks    2.  Breech    3.  Labor status:  Without Labor   4. SROM     Postoperative dx:    1.  Same     Procedure: Procedure(s):   SECTION PRIMARY   Surgeon/Assistant: Surgeon(s):  Aquiles Bautista MD         Anesthesia:  Anesthesiologist: Priyanka  CRNA: Farhat Torres CRNA         QBL:  569 mL        IV Fluids: 1000 mls.   UOP: 100 mls., clear    I/O this shift:  In: -   Out: 569 [Blood:569]   Antibiotics: cefazolin (Ancef), azithromycin     Infant:           Gender: female  infant    Weight: 4300 g (9 lb 7.7 oz)     Apgars: 5  @ 1 minute /     9  @ 5 minutes    Cord gases: Venous:  No results found for: \"PHCVEN\", \"BECVEN\"     Arterial:  No results found for: \"PHCART\", \"BECART\"     Indication for C/Section:  Breech         Priority for C/Section:  routine      Procedure Details:   After informed consent was obtained, the patient was taken to the operating room where  Spinal anesthesia was administered. A time out procedure was completed, confirming the correct patient and correct procedure. Perioperative antibiotics were administered. She was placed in the supine position with leftward tilt and her abdomen was prepped and draped in normal sterile fashion after a Crowell catheter was placed by nursing staff.     After confirming adequate anesthesia, a Pfannenstiel incision was made in the skin with the surgical scalpel. Sharp dissection was carried out over the subsequent layers of tissue down to the fascia at midline. The fascial incision was extended laterally in both directions with fascial stretch. The rectus muscles were divided at midline and the peritoneum was then opened with blunt dissection at its upper margin. The peritoneum was then stretched by pulling caudally and cranially. All layers of the abdominal wall were manually stretched laterally to provide an opening as large as the skin " incision.  An Arun self-retaining retractor was then inserted, taking care to avoid entrapping the bowel.     A bladder flap was created.  A low-transverse incision was made in the lower uterine segment using the scalpel. The uterine incision was extended bilaterally using blunt dissection in a cranial-caudad stretch. The amniotic sac was already ruptured.    The surgeon’s hand was placed into the uterine cavity. The fetal posterior identified. Footling breech with foot in the vagina. Right lower extremity delivered followed by left lower extremity. Fetus with terminal meconium. Abdomen and trunk delivered followed by the left then right upper extremities. Head then delivered with ease.  No nuchal cord was identified . The infant was stunned on the field. The cord was clamped and cut x2. The infant was then passed off the table to the awaiting NICU team for further care. Cord blood & gases were obtained for analysis and routine blood testing and the placenta was expressed.    Oxytocin was administered by IV infusion to enhance uterine contraction. The uterus was cleared of all clots and remaining products of conception, and then repaired in situ. The uterine incision was closed with #0-Vicryl in a running, locked fashion. A second imbricating stitch with a #0-Vicryl was used to obtain excellent hemostasis. Good hemostasis was confirmed. Surgicel powder was applied for added hemostasis.     The pericolic gutters were cleared of all clots.  The peritoneum followed by the rectus muscles were reapproximated with 2-O Vicryl.  The fascia was reapproximated using #0-Vicryl in a running non-locked fashion. The subcutaneous layer was reapproximated with #2-O vicryl in a running, non-locked fashion. The skin was reapproximated using #4-O Monocryl. The incision was reinforced using Skin glue. Cytotec 800 mcg was placed rectally following the procedure.     All sponge, needle, and instrument counts were noted to be correct ×3  at the end of the procedure.     Operative Findings:  Normal appearing uterus, ovaries, and fallopian tubes bilaterally.         Complications:   None      Disposition:   Mother to Mother Baby/Postpartum  in stable condition currently.   Baby to remains with mom  in stable condition currently.       Aquiles Bautista MD  7/8/2024  04:12 CDT

## 2024-07-08 NOTE — LACTATION NOTE
Mother's Name: Nely   Phone #: [phone]  Infant Name: Ginny   : 24  Gestation: 39w0d  Day of life:  Birth weight: 9-7.7 (4300g) LGA Discharge weight:  Weight Loss:   24 hour Summary of Feeds: 16 ml MBM + 20 ml DBM Voids: 2 Stools: 3  Assistive devices (shields, shells, etc):  Significant Maternal history: , Shoulder Dystocia, C/S, Asthma   Maternal Concerns: None  Maternal Goal: Pump exclusively and bottle feed-pumped exclusively with first for 9 months and had enough milk to last 1 year.   Mother's Medications: PNV  Breastpump for home: Yes  Ped follow up appt:    Patient delivered this morning via primary C/S for history of shoulder dystocia with first child. Infant is LGA. BG X 3 are WNL. Patient has pumped twice thus far collecting 5 and 11 ml, is holding infant skin to skin often, and hand expressing. Concerns denied at this time. Much encouragement provided. Pumping education provided as well (see below). Discussed flange size and measured her at a 19 mm per her request. Questions denied.     Instructed patient our lactation team is here for continued support throughout their breastfeeding journey. Our team has encouraged patient to call with any questions or concerns that may arise after discharge.    INFORMATION FOR PUMPING MOTHERS    For Questions or Concerns Please Contact   Our Lactation Services Department  [phone]    Compiled for you by [facility] Lactation Services  Selecting a Breastpump handout from Lactation Education Resources [employer]  How much should my  baby eat “Breastfeeding and Human Lactation” Irving, 4th ed., pg. 228, 2010  Average Feeding Amounts    Age Ounces Milliliters Spoons   Day 1 Drops 5 Less than 1 teaspoon   Day 2 Less than ½ ounce 5-15 Less than 1 Tbsp.   Day 3 ½ - 1 15-30 1-2 Tbsp.   Day 4 1 - 1½  30-45 2-3 Tbsp.   Day 5 1½ - 2 45-60 3-4 Tbsp.   1-3 Weeks 2-3 60-90    1-6 Months 3-5     “Milliliters  (mls), cc’s, and grams (gms)” are interchangeable terms for measurement.  30 mls = 1 ounce  My Breastpump Log with target amounts: Pump at least 8 times daily.  A few more times is even better.  Pump for about 15 minutes each time.  Gradually increase suction from low to your maximum level of comfort. Going past your comfort level will not result in increased supply.  Pain decreases output.Increasing your breastmilk supply handout from Lactation Education [employer]  Plugged Ducts and Mastitis handout from [employer]actation [employer]  Personal Use Breastpumps Medela handout [email or web address]  Hand expression handout from [employer]  Hands-on Pumping handout from [employer]  How to keep your breastpump kit clean handout Accessible version: www.cdc.gov/healthywater/hygiene/healthychildcare/infantfeeding/breastpump.html  Going back to work handout by [employer]  Breastmilk Collection and Storage Medela Handout 3dim  Preventing Engorgement Medela Handout [email or web address]

## 2024-07-08 NOTE — PROGRESS NOTES
Swati De La Fuente  : 1995  MRN: [id number]  CSN: [id number]    Labor & Delivery DEBRA progress note    Subjective   Chief Complaint: She reports leakage of fluid. Last u/s showed baby breech. With history of prevous should dystocia and baby already at weight of previous delivery would like to proceed with primary     HPI: see above    History:    Prenatal Information:  Prenatal Results       Initial Prenatal Labs       Test Value Reference Range Date Time    Hemoglobin        Hematocrit        Platelets        Rubella IgG        Hepatitis B SAg ^ Negative   23     Hepatitis C Ab        RPR  Non Reactive  Non Reactive 24 0834    T. Pallidum Ab         ABO  O   18 0117    Rh  Positive   18 0117    Antibody Screen        HIV        Urine Culture        Gonorrhea  Negative  Negative 24 1110    Chlamydia  Negative  Negative 24 1110    TSH        HgB A1c         Varicella IgG        Hemoglobinopathy Fractionation        Hemoglobinopathy (genetic testing)        Cystic fibrosis                   Fetal testing        Test Value Reference Range Date Time    NIPT        MSAFP        AFP-4                  2nd and 3rd Trimester       Test Value Reference Range Date Time    Hemoglobin (repeated)  11.5 g/dL 11.1 - 15.9 24 0834    Hematocrit (repeated)  33.6 % 34.0 - 46.6 24 0834    Platelets   357 x10E3/uL 150 - 450 24 0834    1 hour GTT   176 mg/dL 70 - 139 24 0834    Antibody Screen (repeated)        3rd TM syphilis scrn (repeated)  RPR   Non Reactive  Non Reactive 24 0834    3rd TM syphilis scrn (repeated) FTA        GTT Fasting  93 mg/dL 70 - 94 24 1013    GTT 1 Hr  193 mg/dL 70 - 179 24 1013    GTT 2 Hr  146 mg/dL 70 - 154 24 1013    GTT 3 Hr  60 mg/dL 70 - 139 24 1013    Group B Strep  Negative  Negative 24 1110              Other testing        Test Value Reference Range Date Time    Parvo IgG         CMV  IgG                   Drug Screening       Test Value Reference Range Date Time    Amphetamine Screen        Barbiturate Screen        Benzodiazepine Screen        Methadone Screen        Phencyclidine Screen        Opiates Screen        THC Screen        Cocaine Screen        Propoxyphene Screen        Buprenorphine Screen        Methamphetamine Screen        Oxycodone Screen        Tricyclic Antidepressants Screen                  Legend    ^: Historical                          External Prenatal Results       Pregnancy Outside Results - Transcribed From Office Records - See Scanned Records For Details       Test Value Date Time    ABO  O  07/23/18 0117    Rh  Positive  07/23/18 0117    Antibody Screen       Varicella IgG       Rubella       Hgb  11.5 g/dL 04/23/24 0834    Hct  33.6 % 04/23/24 0834    HgB A1c        1h GTT  176 mg/dL 04/23/24 0834    3h GTT Fasting  93 mg/dL 04/29/24 1013    3h GTT 1 hour  193 mg/dL 04/29/24 1013    3h GTT 2 hour  146 mg/dL 04/29/24 1013    3h GTT 3 hour   60 mg/dL 04/29/24 1013    Gonorrhea (discrete)  Negative  06/17/24 1110    Chlamydia (discrete)  Negative  06/17/24 1110    RPR  Non Reactive  04/23/24 0834    Syphilis Antibody       HBsAg ^ Negative  11/13/23     Herpes Simplex Virus PCR       Herpes Simplex VIrus Culture       HIV       Hep C RNA Quant PCR       Hep C Antibody       AFP       NIPT       Cystic Fibroisis        Group B Strep  Negative  06/17/24 1110    GBS Susceptibility to Clindamycin       GBS Susceptibility to Erythromycin       Fetal Fibronectin       Genetic Testing, Maternal Blood                 Drug Screening       Test Value Date Time    Urine Drug Screen       Amphetamine Screen       Barbiturate Screen       Benzodiazepine Screen       Methadone Screen       Phencyclidine Screen       Opiates Screen       THC Screen       Cocaine Screen       Propoxyphene Screen       Buprenorphine Screen       Methamphetamine Screen       Oxycodone Screen        Tricyclic Antidepressants Screen                 Legend    ^: Historical                             Past OB History:     OB History    Para Term  AB Living   3 1 1 0 1 1   SAB IAB Ectopic Molar Multiple Live Births   1 0 0 0 0 1      # Outcome Date GA Lbr Main/2nd Weight Sex Type Anes PTL Lv   3 Current            2 SAB  8w0d          1 Term 18 37w0d   M Vag-Spont EPI  MARY      Complications: Shoulder Dystocia       Past Medical History: Past Medical History:   Diagnosis Date    Asthma     only with activity      Past Surgical History History reviewed. No pertinent surgical history.   Family History: Family History   Problem Relation Age of Onset    Breast cancer Neg Hx     Ovarian cancer Neg Hx     Uterine cancer Neg Hx     Colon cancer Neg Hx     Melanoma Neg Hx       Social History:  reports that she has never smoked. She has never used smokeless tobacco.   reports no history of alcohol use.   reports no history of drug use.           High Risk Medical Conditions/Complaints this visit: rupture of membranes    Discussion of Management or Test Interpretation with physcian/provider/healthcare provider: yes    External Records Reviewed: Prenatal history in [facility] from [facility] OB provider - Grace/Outside provider - TAB Corbett reviewed, pregnancy complicated by: previous shoulder dystocia    Review Previous Test Results: Prenatal labs in [facility] reviewed, history of: n/a    Independent Historian: none    Social Determinants to Health: No drug, transportation or housing or other issues noted       Objective   Medical Decision Making:  Amount/Complexity of Data Reviewed  -Labs ordered - admission  -Imaging ordered - no  -IV fluids given - yes  Risk:  Prescription drug management - no  Drug therapy requiring intensive monitoring for toxicity - no  Decision to admit patient - yes  Diagnosis/Treatment limited by social determinants    Min/max vitals past 24 hours:  No data recorded   No data recorded    Pulse  Min: 103  Max: 103   No data recorded        Independent Interpretation NST/FHT's: reassuring and category 1.  external monitors used   Cervix: was not checked.   Contractions:    Review of current test: results irregular    +fern/nitrazine       Final Diagnoses: Rupture of membranes, breech, history of shoulder dystocia       Assessment   IUP at 39w0d  Rupture of membranes  Breech     Plan    section    Narda Corbett MD  2024  02:57 CDT

## 2024-07-09 ENCOUNTER — ANESTHESIA (OUTPATIENT)
Dept: LABOR AND DELIVERY | Facility: HOSPITAL | Age: 29
End: 2024-07-09
Payer: MEDICAID

## 2024-07-09 VITALS
WEIGHT: 196 LBS | BODY MASS INDEX: 36.07 KG/M2 | DIASTOLIC BLOOD PRESSURE: 60 MMHG | RESPIRATION RATE: 14 BRPM | SYSTOLIC BLOOD PRESSURE: 104 MMHG | OXYGEN SATURATION: 96 % | HEIGHT: 62 IN | TEMPERATURE: 97.6 F | HEART RATE: 95 BPM

## 2024-07-09 LAB
BASOPHILS # BLD AUTO: 0.03 10*3/MM3 (ref 0–0.2)
BASOPHILS NFR BLD AUTO: 0.3 % (ref 0–1.5)
DEPRECATED RDW RBC AUTO: 45 FL (ref 37–54)
EOSINOPHIL # BLD AUTO: 0.07 10*3/MM3 (ref 0–0.4)
EOSINOPHIL NFR BLD AUTO: 0.8 % (ref 0.3–6.2)
ERYTHROCYTE [DISTWIDTH] IN BLOOD BY AUTOMATED COUNT: 14.6 % (ref 12.3–15.4)
HCT VFR BLD AUTO: 26.1 % (ref 34–46.6)
HGB BLD-MCNC: 8.4 G/DL (ref 12–15.9)
IMM GRANULOCYTES # BLD AUTO: 0.1 10*3/MM3 (ref 0–0.05)
IMM GRANULOCYTES NFR BLD AUTO: 1.2 % (ref 0–0.5)
LYMPHOCYTES # BLD AUTO: 1.44 10*3/MM3 (ref 0.7–3.1)
LYMPHOCYTES NFR BLD AUTO: 16.6 % (ref 19.6–45.3)
MCH RBC QN AUTO: 27.6 PG (ref 26.6–33)
MCHC RBC AUTO-ENTMCNC: 32.2 G/DL (ref 31.5–35.7)
MCV RBC AUTO: 85.9 FL (ref 79–97)
MONOCYTES # BLD AUTO: 0.82 10*3/MM3 (ref 0.1–0.9)
MONOCYTES NFR BLD AUTO: 9.5 % (ref 5–12)
NEUTROPHILS NFR BLD AUTO: 6.19 10*3/MM3 (ref 1.7–7)
NEUTROPHILS NFR BLD AUTO: 71.6 % (ref 42.7–76)
NRBC BLD AUTO-RTO: 0 /100 WBC (ref 0–0.2)
PLATELET # BLD AUTO: 249 10*3/MM3 (ref 140–450)
PMV BLD AUTO: 10.2 FL (ref 6–12)
RBC # BLD AUTO: 3.04 10*6/MM3 (ref 3.77–5.28)
WBC NRBC COR # BLD AUTO: 8.65 10*3/MM3 (ref 3.4–10.8)

## 2024-07-09 PROCEDURE — 25010000002 ENOXAPARIN PER 10 MG: Performed by: OBSTETRICS & GYNECOLOGY

## 2024-07-09 PROCEDURE — 25010000002 KETOROLAC TROMETHAMINE PER 15 MG: Performed by: OBSTETRICS & GYNECOLOGY

## 2024-07-09 PROCEDURE — 85025 COMPLETE CBC W/AUTO DIFF WBC: CPT | Performed by: OBSTETRICS & GYNECOLOGY

## 2024-07-09 RX ORDER — OXYCODONE HYDROCHLORIDE 5 MG/1
5 TABLET ORAL EVERY 12 HOURS PRN
Qty: 6 TABLET | Refills: 0 | Status: SHIPPED | OUTPATIENT
Start: 2024-07-09 | End: 2024-07-13

## 2024-07-09 RX ADMIN — OXYCODONE HYDROCHLORIDE 5 MG: 5 TABLET ORAL at 06:11

## 2024-07-09 RX ADMIN — ACETAMINOPHEN 650 MG: 325 TABLET, FILM COATED ORAL at 05:00

## 2024-07-09 RX ADMIN — KETOROLAC TROMETHAMINE 30 MG: 30 INJECTION, SOLUTION INTRAMUSCULAR; INTRAVENOUS at 06:11

## 2024-07-09 RX ADMIN — ENOXAPARIN SODIUM 40 MG: 100 INJECTION SUBCUTANEOUS at 06:04

## 2024-07-09 RX ADMIN — IBUPROFEN 600 MG: 600 TABLET, FILM COATED ORAL at 11:51

## 2024-07-09 RX ADMIN — PRENATAL VIT W/ FE FUMARATE-FA TAB 27-0.8 MG 1 TABLET: 27-0.8 TAB at 08:09

## 2024-07-09 RX ADMIN — OXYCODONE HYDROCHLORIDE 10 MG: 5 TABLET ORAL at 14:27

## 2024-07-09 RX ADMIN — ACETAMINOPHEN 650 MG: 325 TABLET, FILM COATED ORAL at 10:29

## 2024-07-09 NOTE — PLAN OF CARE
Goal Outcome Evaluation:  Plan of Care Reviewed With: patient, spouse        Progress: improving  Outcome Evaluation: vss. voiding, shower, LTV incision dry and intact, abd binder in use, IID, ERAs meds cont, PO marshal given for breakthrough pain, pumping, bonding well with .

## 2024-07-09 NOTE — LACTATION NOTE
Mother's Name: Nely   Phone #: [phone]  Infant Name: Ginny   : 24  Gestation: 39w0d  Day of life: 1  Birth weight: 9-7.7 (4300g) LGA Discharge weight:  Weight Loss: -5.23  24 hour Summary of Feeds: 16 ml MBM + 20 ml DBM Voids: 2 Stools: 3  Assistive devices (shields, shells, etc):  Significant Maternal history: , Shoulder Dystocia, C/S, Asthma   Maternal Concerns: None  Maternal Goal: Pump exclusively and bottle feed-pumped exclusively with first for 9 months and had enough milk to last 1 year.   Mother's Medications: PNV  Breastpump for home: Yes  Ped follow up appt:      Patient reports pumping  and collecting from drops to ml. Supplementing with her stored breastmilk collected prior to delivery. Reviewed pumping instructions, plans, and hands on pumping. Transition of milk discussed along with importance of breast stimulation and milk removal for hormone levels and production.     Instructed patient our lactation team is here for continued support throughout their breastfeeding journey. Our team has encouraged patient to call with any questions or concerns that may arise after discharge.

## 2024-07-09 NOTE — LACTATION NOTE
Mother and infant discharging to home this afternoon. To room to provide discharge education. Breastfeeding after discharge handout given and reviewed. Questions addressed. Recommend syringe feeding amounts less than 10 mls as smaller amounts are hard to complete from bottle. Encouragement and support provided.  F/u with NP tomorrow for weight check, mother aware all Lactation related concerns should be addressed with [facility] IBCLC, contact information provided.     Signs of Milk: Fullness, firmness, heaviness of breasts, leaking of milk.  Signs of Good Feed: Breast fullness prior to feed, breasts soft and comfortable after feeding. Infant content after feeding: calm, sleepy, relaxed and without continued hunger cues.  Signs of Plugged Ducts, Engorgement and Mastitis: Plugged ducts (milk entrapment in milk ducts)- small tender knots that often feel like little beans under breast tissue, usually tender. Massage on these areas of concern while breastfeeding or pumping to promote emptying.   Engorgement- fluid or excess milk, breasts become uncomfortably full, tight, firm (compare to the firmness of your cheek (mild), chin (moderate) or forehead (severe). First line of treatment should be to BREASTFEED, if breasts remain full feeling after a feeding, it may be necessary to pump, ONLY UNTIL BREASTS ARE SOFT AND COMFORTABLE. DO NOT OVER PUMP (complete emptying of breasts can trigger even more milk which will cause continued, recurrent Engorgement).  Mastitis- Infection of the breast tissue, most often caused by plugged ducts that are not adequately treated by emptying, recurrent trauma to nipples or breasts (cracked or bleeding nipples). Signs: redness, swelling, tender knots or fever to breasts as well as generalized fever >101 degrees F that is often sudden onset. Treatment of mastitis, BREASTFEED! Pump after breastfeeding to achieve COMPLETE emptying of affected breast, utilizing massage to areas of concern, may use cold  compress to affected area only after breast emptying. May take anti-inflammatories i.e. Ibuprofen, Motrin. CALL your OB for assessment and continued treatment with Antibiotics to adequately treat mastitis.  Infant Care: Over the first 2 weeks it is important to keep record of infant's feeding routine (feeding times and durations), wet and dirty diaper frequency, stool color and any spit ups that may occur.  Keep in mind, ALL babies will lose some weight initially (usually no more than 10% by day 3). Until infant returns to/ surpasses birth weight (which can take up to 2 weeks), it is important to offer feedings AT LEAST EVERY 3 HOURS. Remember, if you choose to supplement infant with formula or previously pumped milk, you should always pump in replacement of that feeding in order to promote and maintain a healthy milk supply!  Maternal Care: REST, sleep when the infant sleeps, stay hydrated (water is optimal) drink to thirst, increase caloric intake - breastfeeding mother's need an ADDITIONAL 500 calories per day , eat 3 meals/day as well as snacks in between, limit CAFFIENE intake to 2 cups/day. Ask your significant other, family members or friends for help when needed, taking advantage of meal trains, allowing others to help with laundry, house chores, etc can help you focus on what is most important early on after delivery… you and your infant, and breastfeeding!   Medications to CONTINUE: Prenatal Vitamins are important to continue taking while breastfeeding. Fish oil, magnesium/calcium supplements often are helpful to support Mothers and their milk supply as well. Tylenol, Ibuprofen, regular Zyrtec, Claritin are SAFE if you suffer from seasonal allergies. Flonase is safe and often an effective medication to take if suffering from sinus drainage/pressure.  Medications to AVOID: Benadryl, Sudafed, any medications including “DM” or have a drying effect to sinus drainage will also dry a mother's milk up. Birth  control- your OB will want to address birth control options with you usually around 4-6 weeks postpartum, be sure to notify your MD if you continue to breastfeed as some birth controls may significantly decrease your milk supply. Herbals- some herbs may also decrease your milk supply: PEPPERMINT, MENTHOL in any form (candies, essential oils, teas, etc), so check labels and avoid using in excess.  Pumping: Although we encourage you to focus on breastfeeding over the first 2-4 weeks, you will need to plan to begin pumping. We do recommend implementing pumping by the time infant is 4 weeks old. Pump 2-3 times per day immediately AFTER breastfeeding, it is normal to collect very small amounts initially, but the more consistently you pump, the more you will begin to collect. Store collected milk in refrigerator or freezer. You should also begin offering infant a bottle around 4 weeks. Remember to use slow flow nipples and PACE the bottle-feed. A bottle feed should take about as long as a breastfeeding session.

## 2024-07-09 NOTE — PROGRESS NOTES
Lincoln Obrien MD  [facility]  [address]  [place] [address]  Office [phone]  Fax [phone]      [facility]   PROGRESS NOTE    Post-Op Day 1 S/P   Subjective     Patient reports:  Pain is well controlled with  ERAS .  She is ambulating. Tolerating diet. Voiding - without difficulty; flatus reported..  Vaginal bleeding is as much as expected.      Objective      Vitals: Vital Signs Range for the last 24 hours  Temperature: Temp:  [97.5 °F (36.4 °C)-98.2 °F (36.8 °C)] 97.7 °F (36.5 °C)   Temp Source: Temp src: Oral   BP: BP: (110-128)/(63-82) 116/63   Pulse: Heart Rate:  [] 107   Respirations: Resp:  [16-18] 16   SPO2: SpO2:  [96 %-100 %] 100 %   O2 Amount (l/min):     O2 Devices Device (Oxygen Therapy): room air   Weight:              Physical Exam    Lungs clear to auscultation bilaterally   Abdomen Soft, non-tender, normal bowel sounds; no bruits, organomegaly or masses.   Incision  healing well   Extremities extremities normal, atraumatic, no cyanosis or edema     I reviewed the patient's new clinical results.  Lab Results (last 24 hours)       Procedure Component Value Units Date/Time    Tissue Pathology Exam [[id number]] Collected: 24 0356    Specimen: Tissue from Placenta Updated: 24 1538    T Pallidum Antibody w/ reflex RPR (Syphilis) [[id number]]  (Normal) Collected: 24 0240    Specimen: Blood Updated: 24 1208     Treponemal AB Total Non-Reactive    Narrative:      Reactive results will reflex RPR testing.            External Prenatal Results       Pregnancy Outside Results - Transcribed From Office Records - See Scanned Records For Details       Test Value Date Time    ABO  O  24 024    Rh  Positive  24    Antibody Screen  Negative  24 024    Varicella IgG       Rubella       Hgb  11.7 g/dL 24 024       11.5 g/dL 24 0834    Hct  35.4 % 24 024       33.6 % 24 0834    HgB A1c         1h GTT  176 mg/dL 24 0834    3h GTT Fasting  93 mg/dL 24 1013    3h GTT 1 hour  193 mg/dL 24 1013    3h GTT 2 hour  146 mg/dL 24 1013    3h GTT 3 hour   60 mg/dL 24 1013    Gonorrhea (discrete)  Negative  24 1110    Chlamydia (discrete)  Negative  24 1110    RPR  Non Reactive  24 0834    Syphilis Antibody       HBsAg ^ Negative  23     Herpes Simplex Virus PCR       Herpes Simplex VIrus Culture       HIV       Hep C RNA Quant PCR       Hep C Antibody       AFP       NIPT       Cystic Fibroisis        Group B Strep  Negative  24 1110    GBS Susceptibility to Clindamycin       GBS Susceptibility to Erythromycin       Fetal Fibronectin       Genetic Testing, Maternal Blood                 Drug Screening       Test Value Date Time    Urine Drug Screen       Amphetamine Screen       Barbiturate Screen       Benzodiazepine Screen       Methadone Screen       Phencyclidine Screen       Opiates Screen       THC Screen       Cocaine Screen       Propoxyphene Screen       Buprenorphine Screen       Methamphetamine Screen       Oxycodone Screen       Tricyclic Antidepressants Screen                 Legend    ^: Historical                            Assessment & Plan        Maternal care for breech presentation, single gestation    Labor and delivery affected by breech presentation    S/P  section    Leakage of amniotic fluid      Assessment:    Nely De La Fuente is Day 1  post-partum  , Low Transverse   .       Plan:  continue post op care.        Lincoln Obrien MD  2024  06:12 CDT

## 2024-07-09 NOTE — ANESTHESIA POSTPROCEDURE EVALUATION
"Patient: Nely De La Fuente    Procedure Summary       Date: 24 Room / Location: [facility] LABOR DELIVERY 2 / [facility] LABOR DELIVERY    Anesthesia Start: 312 Anesthesia Stop: 409    Procedure:  SECTION PRIMARY (Abdomen) Diagnosis:       Maternal care for breech presentation, single gestation      (Maternal care for breech presentation, single gestation [O32.1XX0])    Surgeons: Aquiles Bautista MD Provider: Farhat Torres CRNA    Anesthesia Type: spinal ASA Status: 2 - Emergent            Anesthesia Type: spinal    Vitals  Vitals Value Taken Time   /60 24 0815   Temp 97.6 °F (36.4 °C) 24 0815   Pulse 95 24 0815   Resp 14 24 0815   SpO2 96 % 24 0815           Post Anesthesia Care and Evaluation    Patient location during evaluation: bedside  Patient participation: complete - patient participated  Level of consciousness: awake  Pain management: adequate    Airway patency: patent  Anesthetic complications: No anesthetic complications  PONV Status: none  Cardiovascular status: acceptable  Respiratory status: acceptable  Hydration status: acceptable  Post Neuraxial Block status: Motor and sensory function returned to baseline and No signs or symptoms of PDPH  Comments: Blood pressure 104/60, pulse 95, temperature 97.6 °F (36.4 °C), temperature source Oral, resp. rate 14, height 157.5 cm (62\"), weight 88.9 kg (196 lb), last menstrual period 2022, SpO2 96%, currently breastfeeding.      "

## 2024-07-10 NOTE — PAYOR COMM NOTE
"REF: [id number]     [facility]  FAX  [phone]       Nely De La Fuente (29 y.o. Female)       Date of Birth   1995    Social Security Number       Address   [address]  [place] [address]    Home Phone   [phone]    MRN   [id number]       Holiness   Other    Marital Status   Single                            Admission Date   7/8/24    Admission Type   Elective    Admitting Provider   Aquiles Bautista MD    Attending Provider       Department, Room/Bed   [facility] MOTHER BABY 2A, M264/1       Discharge Date   7/9/2024    Discharge Disposition   Home or Self Care    Discharge Destination                                 Attending Provider: (none)   Allergies: No Known Allergies    Isolation: None   Infection: None   Code Status: Prior    Ht: 157.5 cm (62\")   Wt: 88.9 kg (196 lb)    Admission Cmt: None   Principal Problem: Maternal care for breech presentation, single gestation [O32.1XX0]                   Active Insurance as of 7/8/2024       Primary Coverage       Payor Plan Insurance Group Employer/Plan Group    [place] [id number]       Payor Plan Address Payor Plan Phone Number Payor Plan Fax Number Effective Dates    [employer] [address] [address] [phone]  1/1/2021 - None Entered    [facility] [id number]         Subscriber Name Subscriber Birth Date Member ID       NELY DE LA FUENTE 1995 [id number]                     Emergency Contacts        (Rel.) Home Phone Work Phone Mobile Phone    Charleen De La Fuente (Mother) [phone] -- --    MIRYAM SEGUNDO (Significant Other) -- -- [phone]                 Discharge Summary        Lincoln Obrien MD at 07/09/24 1421                                                  Lincoln Obrien MD  [facility]  [address]  [place] [address]  Office [phone]  Fax [phone]    Discharge Summary     Olivia Mckay " Sudhakar  : 1995  MRN: [id number]  CSN: [id number]    Date of Admission: 2024   Date of Discharge:  2024   Delivering Physician: Aquiles Bautista MD       Admission Diagnosis: Maternal care for breech presentation, single gestation [O32.1XX0]  Labor and delivery affected by breech presentation [O32.1XX0]  Labor and delivery affected by breech presentation [O32.1XX0]   Discharge Diagnosis: Pregnancy at 39w0d - delivered       Procedures:      Hospital Course  See the completed operative report for details regarding antepartum course and delivery.    Her postoperative course was unremarkable.  On POD # 1 she felt like she was ready for discharge.  She was evaluated by Dr. Obrien who agreed she was able to be discharged to home.  She had no febrile morbidity. She had normal bowel and bladder function and was hemodynamically stable.  Her wound was healing well without obvious signs of infections.    Infant  female  fetus weighing 4300 g (9 lb 7.7 oz)   Apgars -  5 @ 1 minute /  9 @ 5 minutes.    Discharge labs  Lab Results   Component Value Date    WBC 8.65 2024    HGB 8.4 (L) 2024    HCT 26.1 (L) 2024     2024       Discharge Medications     Discharge Medications        New Medications        Instructions Start Date   oxyCODONE 5 MG immediate release tablet  Commonly known as: ROXICODONE   5 mg, Oral, Every 12 Hours PRN             Continue These Medications        Instructions Start Date   Prenatal 27-1 27-1 MG tablet tablet   1 tablet, Oral, Daily               External Prenatal Results       Pregnancy Outside Results - Transcribed From Office Records - See Scanned Records For Details       Test Value Date Time    ABO  O  24 0240    Rh  Positive  24 0240    Antibody Screen  Negative  24 0240    Varicella IgG       Rubella       Hgb  8.4 g/dL 24 0615       11.7 g/dL 24 0240       11.5 g/dL 24 0834    Hct  26.1 % 24 0615        35.4 % 07/08/24 0240       33.6 % 04/23/24 0834    HgB A1c        1h GTT  176 mg/dL 04/23/24 0834    3h GTT Fasting  93 mg/dL 04/29/24 1013    3h GTT 1 hour  193 mg/dL 04/29/24 1013    3h GTT 2 hour  146 mg/dL 04/29/24 1013    3h GTT 3 hour   60 mg/dL 04/29/24 1013    Gonorrhea (discrete)  Negative  06/17/24 1110    Chlamydia (discrete)  Negative  06/17/24 1110    RPR  Non Reactive  04/23/24 0834    Syphilis Antibody       HBsAg ^ Negative  11/13/23     Herpes Simplex Virus PCR       Herpes Simplex VIrus Culture       HIV       Hep C RNA Quant PCR       Hep C Antibody       AFP       NIPT       Cystic Fibroisis        Group B Strep  Negative  06/17/24 1110    GBS Susceptibility to Clindamycin       GBS Susceptibility to Erythromycin       Fetal Fibronectin       Genetic Testing, Maternal Blood                 Drug Screening       Test Value Date Time    Urine Drug Screen       Amphetamine Screen       Barbiturate Screen       Benzodiazepine Screen       Methadone Screen       Phencyclidine Screen       Opiates Screen       THC Screen       Cocaine Screen       Propoxyphene Screen       Buprenorphine Screen       Methamphetamine Screen       Oxycodone Screen       Tricyclic Antidepressants Screen                 Legend    ^: Historical                            Discharge Disposition Home or Self Care   Condition on Discharge: good   Follow-up: 2 weeks with Grace Obrien MD  7/9/2024        Electronically signed by Lincoln Obrien MD at 07/09/24 1421       Discharge Order (From admission, onward)       Start     Ordered    07/09/24 1420  Discharge patient  Once        Expected Discharge Date: 07/09/24   Discharge Disposition: Home or Self Care   Physician of Record for Attribution - Please select from Treatment Team: JASON PADILLA [[id number]]   Review needed by CMO to determine Physician of Record: No      Question Answer Comment   Physician of Record for Attribution - Please select from Treatment  Team JASON PADILLA A    Review needed by CMO to determine Physician of Record No        07/09/24 [id number]

## 2024-07-15 LAB
CYTO UR: NORMAL
LAB AP CASE REPORT: NORMAL
Lab: NORMAL
PATH REPORT.FINAL DX SPEC: NORMAL
PATH REPORT.GROSS SPEC: NORMAL

## 2024-07-16 ENCOUNTER — PATIENT OUTREACH (OUTPATIENT)
Dept: LABOR AND DELIVERY | Facility: HOSPITAL | Age: 29
End: 2024-07-16
Payer: MEDICAID

## 2024-07-16 NOTE — OUTREACH NOTE
Motherhood Connection  Unable to Reach       Questions/Answers      Flowsheet Row Responses   Pending Outreach Postpartum Check-in   Call Attempt First   Outcome No answer/busy, Left message                Ashlee Barragan RN  Maternity Nurse Navigator    7/16/2024, 14:57 CDT

## 2024-07-17 ENCOUNTER — PATIENT OUTREACH (OUTPATIENT)
Dept: LABOR AND DELIVERY | Facility: HOSPITAL | Age: 29
End: 2024-07-17
Payer: MEDICAID

## 2024-07-17 NOTE — OUTREACH NOTE
Mother[facility]  Unable to Reach       Questions/Answers      Flowsheet Row Responses   Pending Outreach Postpartum Check-in   Call Attempt Second   Outcome No answer/busy                Ashlee Barragan RN  Maternity Nurse Navigator    7/17/2024, 08:51 CDT

## 2024-07-18 ENCOUNTER — PATIENT OUTREACH (OUTPATIENT)
Dept: LABOR AND DELIVERY | Facility: HOSPITAL | Age: 29
End: 2024-07-18
Payer: MEDICAID

## 2024-07-18 NOTE — OUTREACH NOTE
[facility]  Unable to Reach       Questions/Answers      Flowsheet Row Responses   Pending Outreach Postpartum Check-in   Call Attempt Third   Outcome No answer/busy                Ashlee Barragan RN  Maternity Nurse Navigator    7/18/2024, 14:51 CDT

## 2024-07-22 ENCOUNTER — OFFICE VISIT (OUTPATIENT)
Age: 29
End: 2024-07-22
Payer: MEDICAID

## 2024-07-22 VITALS
WEIGHT: 169.5 LBS | DIASTOLIC BLOOD PRESSURE: 84 MMHG | SYSTOLIC BLOOD PRESSURE: 122 MMHG | BODY MASS INDEX: 31.19 KG/M2 | HEIGHT: 62 IN

## 2024-07-22 PROCEDURE — 99024 POSTOP FOLLOW-UP VISIT: CPT | Performed by: OBSTETRICS & GYNECOLOGY

## 2024-07-22 NOTE — PROGRESS NOTES
"Chief Complaint  Post-op (Pt here for post op, primary c/s for breech presentation 2024. Pt denies any problems today.)    Subjective        Nely De La Fuente presents to [facility] for post op appt. Pt doing well. Reports normal bowel and bladder function. Baby doing well. Breastfeeding. Defers contraception.   History of Present Illness    Objective   Vital Signs:  /84   Ht 157.5 cm (62.01\")   Wt 76.9 kg (169 lb 8 oz)   BMI 30.99 kg/m²   Estimated body mass index is 30.99 kg/m² as calculated from the following:    Height as of this encounter: 157.5 cm (62.01\").    Weight as of this encounter: 76.9 kg (169 lb 8 oz).             Physical Exam   Abdomen: soft/NT/ND normal BS incision well healing  Result Review :                     Assessment and Plan     Diagnoses and all orders for this visit:    1. Postpartum care following  delivery (Primary)  Post op primary c/s due to breech presentation in labor. Doing well. Defers contraception. RTC 4 weeks. Breastfeeding.            Follow Up     No follow-ups on file.  Patient was given instructions and counseling regarding her condition or for health maintenance advice. Please see specific information pulled into the AVS if appropriate.         "

## 2024-07-25 ENCOUNTER — PATIENT OUTREACH (OUTPATIENT)
Dept: CALL CENTER | Facility: HOSPITAL | Age: 29
End: 2024-07-25
Payer: MEDICAID

## 2024-07-25 NOTE — OUTREACH NOTE
Motherhood Connection Survey      Flowsheet Row Responses   [facility] patient discharged from? [facility]   Week 1 attempt successful? Yes   Call end time 1409   Baby sex Girl    discharged home with mother? Yes   Baby sex Girl   Delivery type    Emotional state Acceptance   Family support Yes   Do you have all necessary resources to care for you and your baby?  Yes   Have members of your household adjusted to your baby? Yes   Did you have any problems with pre-eclampsia during this pregnancy? No   Did you have blood glucose issues during this pregnancy No   Lochia amount None   Did you have an episiotomy/tear/abdominal incision? Yes   Feeding Method Breast   Pumping Yes   Supplementing Breast Milk   Breast Condition No   Nipple Condition No   Signs baby is ready to eat Crying, Rooting   Feeding tolerance Wet/dirty diapers   Number of wet diapers x 24 hours 8-10   Last BM x 24 hours 3-4   Umbilical Cord No reported signs or symptoms   Where does the baby usually sleep? Bassinet   Are there stuffed animals, toys, pillows, quilts, blankets, wedges, positioners, bumpers or other loose bedding in the infant's sleeping environment? No   Does the baby ever share a sleep surface in a bed, couch, recliner or other? No   What position do you lay your baby down to sleep? Back   Are you and/or other caregivers smoking inside or outside the baby's home? No   Mom appointment comments: Mother has had her follow up visit with the obgyn   Baby appointment comments: Baby has seen the pediatrician   Call completed? Yes   How satisfied were you with the Motherhood Connection Program? 5              Roxann ROCHE - Licensed Nurse

## 2024-08-19 ENCOUNTER — POSTPARTUM VISIT (OUTPATIENT)
Age: 29
End: 2024-08-19
Payer: MEDICAID

## 2024-08-19 VITALS
BODY MASS INDEX: 30.07 KG/M2 | DIASTOLIC BLOOD PRESSURE: 76 MMHG | HEIGHT: 62 IN | SYSTOLIC BLOOD PRESSURE: 106 MMHG | WEIGHT: 163.4 LBS

## 2024-08-19 DIAGNOSIS — Z12.4 ENCOUNTER FOR SCREENING FOR CERVICAL CANCER: ICD-10-CM

## 2024-08-19 DIAGNOSIS — Z30.011 VISIT FOR ORAL CONTRACEPTIVE PRESCRIPTION: ICD-10-CM

## 2024-08-19 PROCEDURE — 87624 HPV HI-RISK TYP POOLED RSLT: CPT | Performed by: OBSTETRICS & GYNECOLOGY

## 2024-08-19 PROCEDURE — 99213 OFFICE O/P EST LOW 20 MIN: CPT | Performed by: OBSTETRICS & GYNECOLOGY

## 2024-08-19 PROCEDURE — G0123 SCREEN CERV/VAG THIN LAYER: HCPCS | Performed by: OBSTETRICS & GYNECOLOGY

## 2024-08-19 RX ORDER — ACETAMINOPHEN AND CODEINE PHOSPHATE 120; 12 MG/5ML; MG/5ML
1 SOLUTION ORAL DAILY
Qty: 28 TABLET | Refills: 12 | Status: SHIPPED | OUTPATIENT
Start: 2024-08-19 | End: 2025-08-19

## 2024-08-20 LAB
GEN CATEG CVX/VAG CYTO-IMP: NORMAL
HPV I/H RISK 4 DNA CVX QL PROBE+SIG AMP: NOT DETECTED
LAB AP CASE REPORT: NORMAL
LAB AP GYN ADDITIONAL INFORMATION: NORMAL
LAB AP GYN OTHER FINDINGS: NORMAL
Lab: NORMAL
PATH INTERP SPEC-IMP: NORMAL
STAT OF ADQ CVX/VAG CYTO-IMP: NORMAL

## 2024-09-04 NOTE — PROGRESS NOTES
"Chief Complaint  Postpartum Care (Pt here for 6 wk PP visit, repeat c/s 24, last pap     , denies any problems, wants to start back on OCP, currently breastfeeding.)    Subjective        Nely De La Fuente presents to [facility] for postpartum visit. Breastfeeding. Desires Micronor for contraception. Reports normal bowel and bladder function. Denies pain.  Back to regular activities. Needs pap smear. Denies vaginal discharge.   History of Present Illness    Objective   Vital Signs:  /76   Ht 157.5 cm (62.01\")   Wt 74.1 kg (163 lb 6.4 oz)   BMI 29.88 kg/m²   Estimated body mass index is 29.88 kg/m² as calculated from the following:    Height as of this encounter: 157.5 cm (62.01\").    Weight as of this encounter: 74.1 kg (163 lb 6.4 oz).    BMI is >= 25 and <30. (Overweight) The following options were offered after discussion;: exercise counseling/recommendations and nutrition counseling/recommendations      Physical Exam  Exam conducted with a chaperone present.   Constitutional:       Appearance: Normal appearance.   Genitourinary:     Comments: Normal external genitalia, vaginal mucosa pink without lesions, cervix smooth without lesions, no prolapse, bimanual exam with anteverted uterus, normal size, no masses, no CMT, nontender, no vaginal discharge  Neurological:      Mental Status: She is alert.   Psychiatric:         Mood and Affect: Mood normal.         Behavior: Behavior normal.        Result Review :                Assessment and Plan   Diagnoses and all orders for this visit:    1. Postpartum care following  delivery (Primary)  Postpartum s/p repeat c/s, doing well. Pap smear today. Desires micronor for contracpetion. Breastfeeding. RTC yearly. Resume all normal activities.   2. Visit for oral contraceptive prescription    3. Encounter for screening for cervical cancer  -     Liquid-based Pap Smear, Screening  -     HPV DNA Probe, Direct - ThinPrep Vial, Cervix, " Endocervix    Other orders  -     norethindrone (MICRONOR) 0.35 MG tablet; Take 1 tablet by mouth Daily.  Dispense: 28 tablet; Refill: 12             Follow Up   No follow-ups on file.  Patient was given instructions and counseling regarding her condition or for health maintenance advice. Please see specific information pulled into the AVS if appropriate.

## 2025-02-24 RX ORDER — NORGESTIMATE AND ETHINYL ESTRADIOL 7DAYSX3 LO
1 KIT ORAL DAILY
Qty: 28 TABLET | Refills: 12 | Status: SHIPPED | OUTPATIENT
Start: 2025-02-24 | End: 2026-02-24

## (undated) DEVICE — TRAXI PANNICULUS RETRACTOR WITH RETENTUS TECHNOLOGY (BMI 30-50): Brand: TRAXI® PANNICULUS RETRACTOR

## (undated) DEVICE — SUT MONOCRYL 4/0 PS2 27IN Y426H ETY426H

## (undated) DEVICE — KENDALL SCD EXPRESS SLEEVES, KNEE LENGTH, LARGE: Brand: KENDALL SCD

## (undated) DEVICE — Device

## (undated) DEVICE — SUT GUT PLAIN TPR PT 2/0 27IN 53T

## (undated) DEVICE — STRIP,CLOSURE,WOUND,MEDI-STRIP,1/2X4: Brand: MEDLINE

## (undated) DEVICE — CVR HNDL LIGHT RIGID

## (undated) DEVICE — ADHS LIQ MASTISOL 2/3ML

## (undated) DEVICE — GLV SURG SENSICARE PI LF PF 6.5

## (undated) DEVICE — GOWN,PREVENTION PLUS,XLARGE,STERILE: Brand: MEDLINE

## (undated) DEVICE — PAD,NON-ADHERENT,3X8,STERILE,LF,1/PK: Brand: MEDLINE

## (undated) DEVICE — 3M™ MEDIPORE™ H SOFT CLOTH SURGICAL TAPE 2868, 8 INCH X 10 YARD (20,3CM X 9,1M), 6 ROLLS/CASE: Brand: 3M™ MEDIPORE™

## (undated) DEVICE — PENCL SMOKE/EVAC MEGADYNE TELESCP 10FT

## (undated) DEVICE — PAD C-SECTION: Brand: MEDLINE INDUSTRIES, INC.

## (undated) DEVICE — PATIENT RETURN ELECTRODE, SINGLE-USE, CONTACT QUALITY MONITORING, ADULT, WITH 15 FT (4.5 M) CORD. FOR PATIENTS WEIGHING OVER 33LBS. (15KG): Brand: MEGADYNE

## (undated) DEVICE — ANTIBACTERIAL UNDYED BRAIDED (POLYGLACTIN 910), SYNTHETIC ABSORBABLE SUTURE: Brand: COATED VICRYL

## (undated) DEVICE — APPL CHLORAPREP HI/LITE 26ML ORNG

## (undated) DEVICE — SPNG GZ WOVN 4X4IN 12PLY 10/BX STRL